# Patient Record
Sex: FEMALE | Race: BLACK OR AFRICAN AMERICAN | NOT HISPANIC OR LATINO | Employment: UNEMPLOYED | URBAN - METROPOLITAN AREA
[De-identification: names, ages, dates, MRNs, and addresses within clinical notes are randomized per-mention and may not be internally consistent; named-entity substitution may affect disease eponyms.]

---

## 2017-12-01 ENCOUNTER — HOSPITAL ENCOUNTER (EMERGENCY)
Facility: OTHER | Age: 27
Discharge: HOME | End: 2017-12-01
Attending: INTERNAL MEDICINE
Payer: MEDICAID

## 2017-12-01 VITALS
TEMPERATURE: 101 F | RESPIRATION RATE: 18 BRPM | SYSTOLIC BLOOD PRESSURE: 132 MMHG | OXYGEN SATURATION: 99 % | WEIGHT: 215 LBS | DIASTOLIC BLOOD PRESSURE: 84 MMHG | HEART RATE: 81 BPM | BODY MASS INDEX: 36.9 KG/M2

## 2017-12-01 DIAGNOSIS — J02.9 PHARYNGITIS, UNSPECIFIED ETIOLOGY: Primary | ICD-10-CM

## 2017-12-01 LAB
B-HCG UR QL: NEGATIVE
CTP QC/QA: YES
CTP QC/QA: YES
S PYO RRNA THROAT QL PROBE: NEGATIVE

## 2017-12-01 PROCEDURE — 63600175 PHARM REV CODE 636 W HCPCS: Performed by: INTERNAL MEDICINE

## 2017-12-01 PROCEDURE — 81025 URINE PREGNANCY TEST: CPT | Performed by: INTERNAL MEDICINE

## 2017-12-01 PROCEDURE — 96372 THER/PROPH/DIAG INJ SC/IM: CPT

## 2017-12-01 PROCEDURE — 87880 STREP A ASSAY W/OPTIC: CPT

## 2017-12-01 PROCEDURE — 25000003 PHARM REV CODE 250: Performed by: INTERNAL MEDICINE

## 2017-12-01 PROCEDURE — 87070 CULTURE OTHR SPECIMN AEROBIC: CPT

## 2017-12-01 PROCEDURE — 99283 EMERGENCY DEPT VISIT LOW MDM: CPT | Mod: 25

## 2017-12-01 RX ORDER — IBUPROFEN 800 MG/1
800 TABLET ORAL EVERY 8 HOURS PRN
Qty: 30 TABLET | Refills: 0 | Status: SHIPPED | OUTPATIENT
Start: 2017-12-01 | End: 2018-04-17

## 2017-12-01 RX ORDER — DEXAMETHASONE SODIUM PHOSPHATE 4 MG/ML
4 INJECTION, SOLUTION INTRA-ARTICULAR; INTRALESIONAL; INTRAMUSCULAR; INTRAVENOUS; SOFT TISSUE
Status: COMPLETED | OUTPATIENT
Start: 2017-12-01 | End: 2017-12-01

## 2017-12-01 RX ORDER — SULFAMETHOXAZOLE AND TRIMETHOPRIM 800; 160 MG/1; MG/1
1 TABLET ORAL
Status: COMPLETED | OUTPATIENT
Start: 2017-12-01 | End: 2017-12-01

## 2017-12-01 RX ORDER — KETOROLAC TROMETHAMINE 30 MG/ML
60 INJECTION, SOLUTION INTRAMUSCULAR; INTRAVENOUS
Status: COMPLETED | OUTPATIENT
Start: 2017-12-01 | End: 2017-12-01

## 2017-12-01 RX ORDER — LIDOCAINE HYDROCHLORIDE 20 MG/ML
SOLUTION OROPHARYNGEAL
COMMUNITY
End: 2018-04-17

## 2017-12-01 RX ORDER — SULFAMETHOXAZOLE AND TRIMETHOPRIM 800; 160 MG/1; MG/1
1 TABLET ORAL 2 TIMES DAILY
Qty: 20 TABLET | Refills: 0 | Status: SHIPPED | OUTPATIENT
Start: 2017-12-01 | End: 2017-12-11

## 2017-12-01 RX ADMIN — SULFAMETHOXAZOLE AND TRIMETHOPRIM 1 TABLET: 800; 160 TABLET ORAL at 12:12

## 2017-12-01 RX ADMIN — DEXAMETHASONE SODIUM PHOSPHATE 4 MG: 4 INJECTION, SOLUTION INTRAMUSCULAR; INTRAVENOUS at 12:12

## 2017-12-01 RX ADMIN — KETOROLAC TROMETHAMINE 60 MG: 60 INJECTION, SOLUTION INTRAMUSCULAR at 10:12

## 2017-12-01 NOTE — ED TRIAGE NOTES
Pt reports having sore throat x 1 week and pain has gotten worse. Pt reports going to another facility and receiving treatment for throat pain but medication has not worked. Pt reports coughing up mucus and having pain when speaking. Pt denies having any other associated symptoms at this time.

## 2017-12-01 NOTE — ED PROVIDER NOTES
Encounter Date: 12/1/2017       History     Chief Complaint   Patient presents with    Sore Throat     patient reports having sore throat X1 week, patient was treated at  for strep throat, patient reports symptoms worsening     27-year-old female presented to the emergency department complaining of sore throat ×1 week.  She was seen at [facility] 2 days ago and given a Bicillin injection.  She states her throat pain has not improved and she still has low-grade fever.      The history is provided by the patient. No  was used.   Sore Throat   This is a new problem. The current episode started more than 2 days ago. The problem occurs daily. The problem has not changed since onset.Pertinent negatives include no chest pain, no headaches and no shortness of breath. The symptoms are aggravated by swallowing. Nothing relieves the symptoms.     Review of patient's allergies indicates:  No Known Allergies  History reviewed. No pertinent past medical history.  History reviewed. No pertinent surgical history.  Family History   Problem Relation Age of Onset    Diabetes Mother     Thyroid disease Father     Ovarian cancer Maternal Aunt      Social History   Substance Use Topics    Smoking status: Current Some Day Smoker     Years: 2.00     Types: Cigarettes    Smokeless tobacco: Not on file      Comment: 2 cigarettes    Alcohol use Yes      Comment: social     Review of Systems   Constitutional: Positive for fever.   HENT: Positive for sore throat.    Respiratory: Negative for shortness of breath.    Cardiovascular: Negative for chest pain.   Neurological: Negative for headaches.   All other systems reviewed and are negative.      Physical Exam     Initial Vitals [12/01/17 0950]   BP Pulse Resp Temp SpO2   136/85 (!) 117 16 (!) 101 °F (38.3 °C) 96 %      MAP       102         Physical Exam    Nursing note and vitals reviewed.  Constitutional: She appears well-developed and  well-nourished.   HENT:   Head: Normocephalic and atraumatic.   Posterior oropharyngeal tonsillar erythema, edema and exudate; airway is patent   Eyes: Conjunctivae and EOM are normal.   Neck: Normal range of motion.   Cardiovascular: Normal rate and regular rhythm.   Pulmonary/Chest: Breath sounds normal. No respiratory distress.   Abdominal: Soft. Bowel sounds are normal.   Musculoskeletal: Normal range of motion.   Neurological: She is alert.   Skin: Skin is warm and dry.   Psychiatric: She has a normal mood and affect.         ED Course   Procedures  Labs Reviewed   CULTURE, RESPIRATORY  - THROAT   POCT RAPID STREP A   POCT URINE PREGNANCY             Medical Decision Making:   Initial Assessment:   27-year-old female presented to the emergency department complaining of sore throat ×1 week.  She was seen at [facility] 2 days ago and given a Bicillin injection.  She states her throat pain has not improved and she still has low-grade fever.    Differential Diagnosis:   Strep throat  Acute nasopharyngitis    ED Management:  Rapid strep was negative.  Throat culture was obtained.  Patient was already been treated with Bicillin, so I will cover her for resistant staph with Bactrim.  She was also given a dose of Decadron for pharyngeal tonsillar edema and advised to follow-up with her primary care physician in 3 days for reevaluation and review of throat culture.                   ED Course      Clinical Impression:   The encounter diagnosis was Pharyngitis, unspecified etiology.    Disposition:   Disposition: Discharged  Condition: Stable                        Diogenes Alvarez MD  12/01/17 4746

## 2017-12-03 LAB — BACTERIA THROAT CULT: NORMAL

## 2017-12-18 ENCOUNTER — HOSPITAL ENCOUNTER (EMERGENCY)
Facility: OTHER | Age: 27
End: 2017-12-18
Attending: EMERGENCY MEDICINE
Payer: MEDICAID

## 2017-12-18 VITALS
RESPIRATION RATE: 18 BRPM | TEMPERATURE: 98 F | HEIGHT: 63 IN | OXYGEN SATURATION: 98 % | BODY MASS INDEX: 38.09 KG/M2 | SYSTOLIC BLOOD PRESSURE: 130 MMHG | WEIGHT: 215 LBS | HEART RATE: 95 BPM | DIASTOLIC BLOOD PRESSURE: 93 MMHG

## 2017-12-18 DIAGNOSIS — N89.8 VAGINAL DISCHARGE: ICD-10-CM

## 2017-12-18 DIAGNOSIS — J36 PERITONSILLAR ABSCESS: Primary | ICD-10-CM

## 2017-12-18 LAB
ALBUMIN SERPL-MCNC: 3.2 G/DL (ref 3.3–5.5)
ALP SERPL-CCNC: 58 U/L (ref 42–141)
B-HCG UR QL: NEGATIVE
BILIRUB SERPL-MCNC: 0.4 MG/DL (ref 0.2–1.6)
BUN SERPL-MCNC: 11 MG/DL (ref 7–22)
CALCIUM SERPL-MCNC: 8.9 MG/DL (ref 8–10.3)
CHLORIDE SERPL-SCNC: 104 MMOL/L (ref 98–108)
CREAT SERPL-MCNC: 0.9 MG/DL (ref 0.6–1.2)
CTP QC/QA: YES
CTP QC/QA: YES
GLUCOSE SERPL-MCNC: 94 MG/DL (ref 73–118)
POC ALT (SGPT): 15 U/L (ref 10–47)
POC AST (SGOT): 21 U/L (ref 11–38)
POC TCO2: 25 MMOL/L (ref 18–33)
POTASSIUM BLD-SCNC: 3.4 MMOL/L (ref 3.6–5.1)
PROTEIN, POC: 7.2 G/DL (ref 6.4–8.1)
S PYO RRNA THROAT QL PROBE: NEGATIVE
SODIUM BLD-SCNC: 140 MMOL/L (ref 128–145)

## 2017-12-18 PROCEDURE — 87591 N.GONORRHOEAE DNA AMP PROB: CPT

## 2017-12-18 PROCEDURE — 96368 THER/DIAG CONCURRENT INF: CPT

## 2017-12-18 PROCEDURE — 85025 COMPLETE CBC W/AUTO DIFF WBC: CPT

## 2017-12-18 PROCEDURE — 99285 EMERGENCY DEPT VISIT HI MDM: CPT | Mod: 25

## 2017-12-18 PROCEDURE — 80053 COMPREHEN METABOLIC PANEL: CPT

## 2017-12-18 PROCEDURE — 81025 URINE PREGNANCY TEST: CPT | Performed by: EMERGENCY MEDICINE

## 2017-12-18 PROCEDURE — S0077 INJECTION, CLINDAMYCIN PHOSP: HCPCS | Performed by: EMERGENCY MEDICINE

## 2017-12-18 PROCEDURE — 87880 STREP A ASSAY W/OPTIC: CPT

## 2017-12-18 PROCEDURE — 96366 THER/PROPH/DIAG IV INF ADDON: CPT

## 2017-12-18 PROCEDURE — 96375 TX/PRO/DX INJ NEW DRUG ADDON: CPT

## 2017-12-18 PROCEDURE — 99285 EMERGENCY DEPT VISIT HI MDM: CPT | Mod: 25,27

## 2017-12-18 PROCEDURE — 96365 THER/PROPH/DIAG IV INF INIT: CPT

## 2017-12-18 PROCEDURE — 25500020 PHARM REV CODE 255

## 2017-12-18 PROCEDURE — 87660 TRICHOMONAS VAGIN DIR PROBE: CPT

## 2017-12-18 PROCEDURE — 25000003 PHARM REV CODE 250: Performed by: EMERGENCY MEDICINE

## 2017-12-18 PROCEDURE — 87480 CANDIDA DNA DIR PROBE: CPT

## 2017-12-18 PROCEDURE — 99285 EMERGENCY DEPT VISIT HI MDM: CPT | Mod: ,,, | Performed by: EMERGENCY MEDICINE

## 2017-12-18 RX ORDER — CLINDAMYCIN PHOSPHATE 600 MG/50ML
600 INJECTION, SOLUTION INTRAVENOUS
Status: COMPLETED | OUTPATIENT
Start: 2017-12-18 | End: 2017-12-18

## 2017-12-18 RX ADMIN — IOHEXOL: 350 INJECTION, SOLUTION INTRAVENOUS at 04:12

## 2017-12-18 RX ADMIN — CLINDAMYCIN IN 5 PERCENT DEXTROSE 600 MG: 12 INJECTION, SOLUTION INTRAVENOUS at 06:12

## 2017-12-18 NOTE — ED PROVIDER NOTES
Encounter Date: 12/18/2017       History     Chief Complaint   Patient presents with    Sore Throat     A 27-year-old female who presents to the ED with complaints of sore throat for 3 weeks.  Patient was seen at [facility] at the end of November.  Patient was given Bicillin for treatment of strep.  Patient was seen here on December 1 2017 due to complaints of sore throat.  Patient was treated for Bactrim DS.  Throat culture was performed. No isolates.  Patient continues to complain of sore throat.  Patient reports a yellowish vaginal discharge ×1 week.  Denies abdominal pain.      The history is provided by the patient.   Sore Throat   This is a new problem. The current episode started more than 1 week ago. The problem has been gradually worsening. Pertinent negatives include no chest pain and no shortness of breath. The symptoms are aggravated by swallowing. Nothing relieves the symptoms.     Review of patient's allergies indicates:  No Known Allergies  History reviewed. No pertinent past medical history.  History reviewed. No pertinent surgical history.  Family History   Problem Relation Age of Onset    Diabetes Mother     Thyroid disease Father     Ovarian cancer Maternal Aunt      Social History   Substance Use Topics    Smoking status: Former Smoker     Years: 2.00    Smokeless tobacco: Never Used      Comment: 2 cigarettes    Alcohol use Yes      Comment: social     Review of Systems   Constitutional: Negative for fever.   HENT: Positive for sore throat.    Respiratory: Negative for shortness of breath.    Cardiovascular: Negative for chest pain.   Gastrointestinal: Negative for nausea.   Genitourinary: Positive for vaginal discharge. Negative for dysuria.   Musculoskeletal: Negative for back pain.   Skin: Negative for rash.   Neurological: Negative for weakness.   Hematological: Does not bruise/bleed easily.   All other systems reviewed and are negative.      Physical Exam     Initial Vitals [12/18/17  1347]   BP Pulse Resp Temp SpO2   (!) 143/89 102 14 98.1 °F (36.7 °C) 97 %      MAP       107         Physical Exam    Nursing note and vitals reviewed.  Constitutional: Vital signs are normal. She appears well-developed. She is cooperative.  Non-toxic appearance.   HENT:   Head: Normocephalic and atraumatic.   Right Ear: Tympanic membrane, external ear and ear canal normal.   Left Ear: Tympanic membrane, external ear and ear canal normal.   Nose: Nose normal.   Mouth/Throat: Mucous membranes are normal. Oropharyngeal exudate, posterior oropharyngeal edema and posterior oropharyngeal erythema present.   Right tonsil larger and canales than left. Possible early peritonsillar abscess.   Eyes: Conjunctivae and lids are normal.   Neck: Normal range of motion. Neck supple.   Cardiovascular: Normal rate and regular rhythm.   Pulmonary/Chest: Effort normal and breath sounds normal.   Abdominal: Normal appearance and bowel sounds are normal. There is no tenderness.   Genitourinary: Uterus normal. There is no rash, tenderness, lesion or injury on the right labia. There is no rash, tenderness, lesion or injury on the left labia. Cervix exhibits discharge. Cervix exhibits no motion tenderness and no friability. Right adnexum displays no mass, no tenderness and no fullness. Left adnexum displays no mass, no tenderness and no fullness. No erythema, tenderness or bleeding in the vagina. No foreign body in the vagina. Vaginal discharge found.   Genitourinary Comments: Thick white vaginal discharge noted. Chaperone present.    Musculoskeletal: Normal range of motion.   Lymphadenopathy:     She has cervical adenopathy.        Right cervical: Superficial cervical adenopathy present.        Left cervical: Superficial cervical adenopathy present.   Neurological: She is alert and oriented to person, place, and time.   Skin: Skin is warm, dry and intact.   Psychiatric: She has a normal mood and affect. Judgment and thought content normal.  Cognition and memory are normal.         ED Course   Procedures  Labs Reviewed   POCT CMP - Abnormal; Notable for the following:        Result Value    Albumin, POC 3.2 (*)     POC Potassium 3.4 (*)     All other components within normal limits   C. TRACHOMATIS/N. GONORRHOEAE BY AMP DNA   VAGINOSIS SCREEN BY DNA PROBE   POCT RAPID STREP A   POCT URINE PREGNANCY   POCT CBC   POCT CMP             Medical Decision Making:   Initial Assessment:   A 27-year-old female presents to the ED with complaints of sore throat for 3 weeks.  Patient was treated for strep with Bicillin IM at the end of November.  Patient then treated with Bactrim DS on December 1, 2017. Pt continue to have a sore throat.   Differential Diagnosis:   Pharyngitis, viral pharyngitis, peritonsillar abscess.  Clinical Tests:   Lab Tests: Ordered and Reviewed  Radiological Study: Ordered and Reviewed  ED Management:  Physical exam.  CMP, CBC.  CT of soft tissue neck with contrast.  Patient transferred to [facility] for ENT evaluation.  Will contact with wet prep and GC chlamydia results.               Attending Attestation:     Physician Attestation Statement for NP/PA:   I have conducted a face to face encounter with this patient in addition to the NP/PA, due to NP/PA Request    Other NP/PA Attestation Additions:    History of Present Illness: Ms Nettles reports persistent sore throat after completing 2 courses of abx over the past 3 weeks. Still able to swallow and perform normal adls but it hurts.    Physical Exam: Well appearing non toxic pt with normal voice, well kempt. NAD  No resp distress, moving air well. No tachypnea.   Positive diffuse posterior oropharyngeal erythema with bilateral full tonsils. Subtle R peritonsillar fullness suspicious for early peritonsillar abscess. Normal midline non deviated uvula.    Medical Decision Making: Labs, CT performed, c/w early abscess, peritonsillar. [facility] called for ENT consult, called back and states ENT  wants to auto accept. Attempted to discuss with ED attending dr rodrigues but reportedly quite busy, unable to talk. Pt is stable and non toxic at this time.                  ED Course      Clinical Impression:   The encounter diagnosis was Peritonsillar abscess.                           REINALDO Tsang  12/19/17 1748       Ольга Rankin MD  12/20/17 1149       Ольга Rankin MD  12/20/17 1142

## 2017-12-18 NOTE — ED TRIAGE NOTES
Onset of sore throat and difficulty swallowing Friday. Pt states she has taken ibuprofen without relief. Airway patent. In no resp distress. Tonsils edematous no exudate noted. Rapid strep swab obtained

## 2017-12-18 NOTE — ED TRIAGE NOTES
Pt presents to ED with c/o difficulty swallowing since Friday. Pt reports taking ibuprofen for pain but has not helped. Pt denies any other associated symptoms.

## 2017-12-19 ENCOUNTER — HOSPITAL ENCOUNTER (OUTPATIENT)
Facility: HOSPITAL | Age: 27
Discharge: HOME | End: 2017-12-20
Attending: EMERGENCY MEDICINE | Admitting: OTOLARYNGOLOGY
Payer: MEDICAID

## 2017-12-19 DIAGNOSIS — J36 PERITONSILLAR ABSCESS: ICD-10-CM

## 2017-12-19 LAB
C TRACH DNA SPEC QL NAA+PROBE: NOT DETECTED
CANDIDA RRNA VAG QL PROBE: NEGATIVE
G VAGINALIS RRNA GENITAL QL PROBE: POSITIVE
N GONORRHOEA DNA SPEC QL NAA+PROBE: NOT DETECTED
T VAGINALIS RRNA GENITAL QL PROBE: NEGATIVE

## 2017-12-19 PROCEDURE — 63600175 PHARM REV CODE 636 W HCPCS: Performed by: OTOLARYNGOLOGY

## 2017-12-19 PROCEDURE — S0077 INJECTION, CLINDAMYCIN PHOSP: HCPCS | Performed by: OTOLARYNGOLOGY

## 2017-12-19 PROCEDURE — G0378 HOSPITAL OBSERVATION PER HR: HCPCS

## 2017-12-19 PROCEDURE — 25000003 PHARM REV CODE 250: Performed by: OTOLARYNGOLOGY

## 2017-12-19 PROCEDURE — 25000003 PHARM REV CODE 250: Performed by: STUDENT IN AN ORGANIZED HEALTH CARE EDUCATION/TRAINING PROGRAM

## 2017-12-19 PROCEDURE — 63600175 PHARM REV CODE 636 W HCPCS: Performed by: STUDENT IN AN ORGANIZED HEALTH CARE EDUCATION/TRAINING PROGRAM

## 2017-12-19 PROCEDURE — 94761 N-INVAS EAR/PLS OXIMETRY MLT: CPT

## 2017-12-19 RX ORDER — DEXAMETHASONE SODIUM PHOSPHATE 4 MG/ML
8 INJECTION, SOLUTION INTRA-ARTICULAR; INTRALESIONAL; INTRAMUSCULAR; INTRAVENOUS; SOFT TISSUE EVERY 6 HOURS
Status: DISCONTINUED | OUTPATIENT
Start: 2017-12-19 | End: 2017-12-19

## 2017-12-19 RX ORDER — CLINDAMYCIN PHOSPHATE 900 MG/50ML
900 INJECTION, SOLUTION INTRAVENOUS
Status: DISCONTINUED | OUTPATIENT
Start: 2017-12-19 | End: 2017-12-20 | Stop reason: HOSPADM

## 2017-12-19 RX ORDER — PROMETHAZINE HYDROCHLORIDE 12.5 MG/1
12.5 TABLET ORAL EVERY 6 HOURS PRN
Status: DISCONTINUED | OUTPATIENT
Start: 2017-12-19 | End: 2017-12-20 | Stop reason: HOSPADM

## 2017-12-19 RX ORDER — ONDANSETRON 2 MG/ML
8 INJECTION INTRAMUSCULAR; INTRAVENOUS EVERY 8 HOURS PRN
Status: DISCONTINUED | OUTPATIENT
Start: 2017-12-19 | End: 2017-12-20 | Stop reason: HOSPADM

## 2017-12-19 RX ORDER — MORPHINE SULFATE 2 MG/ML
6 INJECTION, SOLUTION INTRAMUSCULAR; INTRAVENOUS
Status: COMPLETED | OUTPATIENT
Start: 2017-12-19 | End: 2017-12-19

## 2017-12-19 RX ORDER — DEXAMETHASONE SODIUM PHOSPHATE 4 MG/ML
8 INJECTION, SOLUTION INTRA-ARTICULAR; INTRALESIONAL; INTRAMUSCULAR; INTRAVENOUS; SOFT TISSUE EVERY 8 HOURS
Status: DISCONTINUED | OUTPATIENT
Start: 2017-12-19 | End: 2017-12-20 | Stop reason: HOSPADM

## 2017-12-19 RX ORDER — HYDROCODONE BITARTRATE AND ACETAMINOPHEN 5; 325 MG/1; MG/1
1 TABLET ORAL EVERY 4 HOURS PRN
Status: DISCONTINUED | OUTPATIENT
Start: 2017-12-19 | End: 2017-12-20 | Stop reason: HOSPADM

## 2017-12-19 RX ORDER — MORPHINE SULFATE 2 MG/ML
2 INJECTION, SOLUTION INTRAMUSCULAR; INTRAVENOUS
Status: DISCONTINUED | OUTPATIENT
Start: 2017-12-19 | End: 2017-12-20 | Stop reason: HOSPADM

## 2017-12-19 RX ORDER — DEXTROSE MONOHYDRATE, SODIUM CHLORIDE, AND POTASSIUM CHLORIDE 50; 1.49; 4.5 G/1000ML; G/1000ML; G/1000ML
INJECTION, SOLUTION INTRAVENOUS CONTINUOUS
Status: DISCONTINUED | OUTPATIENT
Start: 2017-12-19 | End: 2017-12-20 | Stop reason: HOSPADM

## 2017-12-19 RX ADMIN — CLINDAMYCIN IN 5 PERCENT DEXTROSE 900 MG: 18 INJECTION, SOLUTION INTRAVENOUS at 06:12

## 2017-12-19 RX ADMIN — MORPHINE SULFATE 6 MG: 2 INJECTION, SOLUTION INTRAMUSCULAR; INTRAVENOUS at 01:12

## 2017-12-19 RX ADMIN — MORPHINE SULFATE 2 MG: 2 INJECTION, SOLUTION INTRAMUSCULAR; INTRAVENOUS at 09:12

## 2017-12-19 RX ADMIN — CLINDAMYCIN IN 5 PERCENT DEXTROSE 900 MG: 18 INJECTION, SOLUTION INTRAVENOUS at 11:12

## 2017-12-19 RX ADMIN — HYDROCODONE BITARTRATE AND ACETAMINOPHEN 1 TABLET: 5; 325 TABLET ORAL at 11:12

## 2017-12-19 RX ADMIN — DEXAMETHASONE SODIUM PHOSPHATE 8 MG: 4 INJECTION, SOLUTION INTRAMUSCULAR; INTRAVENOUS at 08:12

## 2017-12-19 RX ADMIN — ONDANSETRON 8 MG: 2 INJECTION INTRAMUSCULAR; INTRAVENOUS at 08:12

## 2017-12-19 RX ADMIN — DEXAMETHASONE SODIUM PHOSPHATE 8 MG: 4 INJECTION, SOLUTION INTRAMUSCULAR; INTRAVENOUS at 01:12

## 2017-12-19 RX ADMIN — DEXTROSE MONOHYDRATE, SODIUM CHLORIDE, AND POTASSIUM CHLORIDE: 50; 4.5; 1.49 INJECTION, SOLUTION INTRAVENOUS at 02:12

## 2017-12-19 RX ADMIN — MORPHINE SULFATE 2 MG: 2 INJECTION, SOLUTION INTRAMUSCULAR; INTRAVENOUS at 07:12

## 2017-12-19 RX ADMIN — TOPICAL ANESTHETIC: 200 SPRAY DENTAL; PERIODONTAL at 01:12

## 2017-12-19 RX ADMIN — ONDANSETRON 8 MG: 2 INJECTION INTRAMUSCULAR; INTRAVENOUS at 01:12

## 2017-12-19 RX ADMIN — DEXAMETHASONE SODIUM PHOSPHATE 8 MG: 4 INJECTION, SOLUTION INTRAMUSCULAR; INTRAVENOUS at 06:12

## 2017-12-19 RX ADMIN — HYDROCODONE BITARTRATE AND ACETAMINOPHEN 1 TABLET: 5; 325 TABLET ORAL at 06:12

## 2017-12-19 RX ADMIN — PROMETHAZINE HYDROCHLORIDE 12.5 MG: 12.5 TABLET ORAL at 10:12

## 2017-12-19 RX ADMIN — MORPHINE SULFATE 2 MG: 2 INJECTION, SOLUTION INTRAMUSCULAR; INTRAVENOUS at 06:12

## 2017-12-19 RX ADMIN — CLINDAMYCIN IN 5 PERCENT DEXTROSE 900 MG: 18 INJECTION, SOLUTION INTRAVENOUS at 02:12

## 2017-12-19 RX ADMIN — DEXTROSE MONOHYDRATE, SODIUM CHLORIDE, AND POTASSIUM CHLORIDE: 50; 4.5; 1.49 INJECTION, SOLUTION INTRAVENOUS at 11:12

## 2017-12-19 NOTE — ED TRIAGE NOTES
Pt transfer from [facility] for peritonsillar abscess since Saturday night. Denies fever,chills, n/v. + loss of appetite      LOC: The patient is awake, alert, aware of environment with an appropriate affect. Oriented x4, speaking appropriately  APPEARANCE: Pt resting comfortably, in no acute distress, pt is clean and well groomed, clothing properly fastened  SKIN:The skin is warm and dry, color consistent with ethnicity, patient has normal skin turgor and moist mucus membranes  RESPIRATORY:Airway is open and patent, respirations are spontaneous, patient has a normal effort and rate, no accessory muscle use noted.  CARDIAC: Normal rate and rhythm, no peripheral edema noted, capillary refill < 3 seconds, bilateral radial pulses 2+.  NEUROLOGIC: PERRLA, facial expression is symmetrical, patient moving all extremities spontaneously, normal sensation in all extremities when touched with a finger.  Follows all commands appropriately  MUSCULOSKELETAL: Patient moving all extremities spontaneously, no obvious swelling or deformities noted.  THROAT: + tonsilar abscess

## 2017-12-19 NOTE — HPI
Gloria Nettles is a 28 yo female with no significant PMH presenting with 3 weeks of sore throat. She went to an urgent care three weeks ago where she was initially given Bicillin. She returned 12/1 with continued sore throat and fever, where she was given Bactrim DS. Today she returned, now with odynophagia and muffled voice. CT of the neckrevealed a possible right peritonsillar abscess. She was transferred to [facility] for ENT evaluation.  She denies any relief from the single dose of IV clindamycin she received. She has not eaten in the last day, difficulty drinking liquids today. No fevers. Denies SOB.

## 2017-12-19 NOTE — ED NOTES
Spoke with Flori at Transfer center. Updated set of VS obtained and reported, Accepting physician ( Dr. Contreras) was received and contact information.

## 2017-12-19 NOTE — ED NOTES
Patient sitting up on the stretcher, Respirations even and unlabored, no distress noted. She denies any needs at this time. Will continue to monitor until transfer.

## 2017-12-19 NOTE — ASSESSMENT & PLAN NOTE
26 yo woman presenting with right peritonsillar abscess.    -I&D performed at bedside. See below  -admit to ENT for IV abx  -decadron, pain control  -ok for regular diet  -will evaluate for clinical improvement.  -d/w Dr. Benito      Procedure: After informed consent was obtained, the patient's tonsillar pillars were numbed with hurricaine spray. Lidocaine with epinephrine 1:100,000 was injected along the right anterior tonsillar pillar. An 18 gauge needle was inserted and aspirated without return of pus. A linear incision was then made after anesthesia was achieved. Blunt dissection using hemostat opened the incision with return of 0cc pus. The patient tolerated the procedure well. There were no apparent complications.

## 2017-12-19 NOTE — SUBJECTIVE & OBJECTIVE
Medications:  Continuous Infusions:  Scheduled Meds:   dexamethasone  8 mg Intravenous Q6H     PRN Meds:     Current Facility-Administered Medications on File Prior to Encounter   Medication    [COMPLETED] clindamycin 600 MG/50 ML D5W 600 mg/50 mL IVPB 600 mg    [COMPLETED] omnipaque 350 iohexol 350 mg iodine/mL     Current Outpatient Prescriptions on File Prior to Encounter   Medication Sig    ibuprofen (ADVIL,MOTRIN) 800 MG tablet Take 1 tablet (800 mg total) by mouth every 8 (eight) hours as needed for Pain.    lidocaine HCl 2% (XYLOCAINE) 2 % Soln by Mucous Membrane route every 3 (three) hours.    potassium chloride SA (K-DUR,KLOR-CON) 20 MEQ tablet Take 1 tablet (20 mEq total) by mouth once daily.       Review of patient's allergies indicates:  No Known Allergies    History reviewed. No pertinent past medical history.  History reviewed. No pertinent surgical history.  Family History     Problem Relation (Age of Onset)    Diabetes Mother    Ovarian cancer Maternal Aunt    Thyroid disease Father        Social History Main Topics    Smoking status: Former Smoker     Years: 2.00    Smokeless tobacco: Never Used      Comment: 2 cigarettes    Alcohol use Yes      Comment: social    Drug use: No    Sexual activity: Yes     Partners: Male     Birth control/ protection: Inserts     Review of Systems   Constitutional: Positive for appetite change. Negative for fever.   HENT: Positive for congestion, sore throat, trouble swallowing and voice change. Negative for drooling, ear pain, facial swelling and hearing loss.    Eyes: Negative for photophobia and pain.   Respiratory: Negative for shortness of breath, wheezing and stridor.    Cardiovascular: Negative for chest pain.   Gastrointestinal: Negative for abdominal pain.   Musculoskeletal: Negative for back pain and gait problem.   Allergic/Immunologic: Negative for immunocompromised state.   Neurological: Negative for facial asymmetry, light-headedness, numbness  and headaches.   Hematological: Does not bruise/bleed easily.   Psychiatric/Behavioral: Negative for agitation, behavioral problems and confusion.     Objective:     Vital Signs (Most Recent):  Temp: 98.2 °F (36.8 °C) (12/18/17 2228)  Pulse: 88 (12/18/17 2228)  Resp: 18 (12/18/17 2228)  BP: (!) 148/92 (12/18/17 2228)  SpO2: 98 % (12/18/17 2228) Vital Signs (24h Range):  Temp:  [97.6 °F (36.4 °C)-98.2 °F (36.8 °C)] 98.2 °F (36.8 °C)  Pulse:  [] 88  Resp:  [14-18] 18  SpO2:  [96 %-98 %] 98 %  BP: (130-148)/(89-95) 148/92        There is no height or weight on file to calculate BMI.         Physical Exam   Constitutional: Uncomfortable-appearing / communicating with muffled voice.  NAD.  Eyes: EOM I Bilaterally  Head/Face: Normocephalic.  Negative paranasal sinus pressure/tenderness.  Salivary glands WNL.  House Brackmann I Bilaterally.  Nose: No gross nasal septal deviation. Inferior Turbinates 2+ bilaterally. No septal perforation. No masses/lesions. External nasal skin without masses/lesions.  Oral Cavity: Gingiva/lips WNL.  FOM Soft, no masses palpated. Oral Tongue mobile. Hard Palate WNL.   Oropharynx: Bilateral enlarged tonsils, R>L. No exudates present. Deviation of uvula to left. Moderate trismus  Neck/Lymphatic: No LAD I-VI bilaterally.  No thyromegaly.  No masses noted on exam.  Neuro/Psychiatric: AOx3.  Normal mood and affect.   Cardiovascular: Normal carotid pulses bilaterally, no increasing jugular venous distention noted at cervical region bilaterally.    Respiratory: Normal respiratory effort, no stridor, no retractions noted.        Significant Labs:  BMP: No results for input(s): GLU, CL, CO2, BUN, CREATININE, CALCIUM, MG in the last 168 hours.    Invalid input(s): SODIUM, POTASSIUM  CBC: No results for input(s): WBC, RBC, HGB, HCT, PLT, MCV, MCH, MCHC in the last 168 hours.    Significant Diagnostics:  CT Neck: Enlargement of both palatine tonsils, right more than left.  A small hypoattenuating  area within the right peritonsillar region is suspicious for a phlegmon or early small forming abscess.

## 2017-12-19 NOTE — ED PROVIDER NOTES
Encounter Date: 12/18/2017       History     Chief Complaint   Patient presents with    transfer     ENT transfer from [facility] ED for peritonsillar abscess. Airway patent. Complains of sore throat.     Ms Nettles is a 28 yo woman with no pertinent past medical history who has been transferred here from [facility] urgent care for management of peritonsillar abscess noted on CT scan. Pt has been feeing needling pain on the right side of her throat of 9/10 intensity for the past three weeks with gradual onset. Pt was initially seen in that urgent care three weeks prior and diagnosed with pharyngitis and treated with a bicillin shot and a course of bactrim, which she completed. Pt's symptoms improved for a time, however returned and worsened to the point where she is having difficulty swallowing. Pt describes the sensation as something being caught in her throat. Pt has never experienced anything like this before and cannot attribute any cause to her current symptoms. Nothing has been able to alleviate her pain thus far.     Pain does not radiate anywhere. She denies current fever. Pt does endorse some mild shortness of breath and headache.           Review of patient's allergies indicates:  No Known Allergies  History reviewed. No pertinent past medical history.  History reviewed. No pertinent surgical history.  Family History   Problem Relation Age of Onset    Diabetes Mother     Thyroid disease Father     Ovarian cancer Maternal Aunt      Social History   Substance Use Topics    Smoking status: Former Smoker     Years: 2.00    Smokeless tobacco: Never Used      Comment: 2 cigarettes    Alcohol use Yes      Comment: social     Review of Systems   Constitutional: Negative for fever and unexpected weight change.   HENT: Positive for drooling, sore throat, trouble swallowing and voice change. Negative for ear pain, sinus pressure and sneezing.    Eyes: Negative for pain and visual disturbance.   Respiratory:  Negative for cough, chest tightness, shortness of breath and wheezing.    Cardiovascular: Negative for chest pain, palpitations and leg swelling.   Gastrointestinal: Negative for abdominal pain, constipation, diarrhea, nausea and vomiting.   Endocrine: Negative for polydipsia and polyuria.   Genitourinary: Negative for decreased urine volume, difficulty urinating, dysuria and urgency.   Musculoskeletal: Negative for arthralgias and myalgias.   Skin: Negative for color change and rash.   Allergic/Immunologic: Negative for environmental allergies and food allergies.   Neurological: Negative for dizziness, syncope, weakness, light-headedness and headaches.   Psychiatric/Behavioral: Negative for confusion and dysphoric mood. The patient is not nervous/anxious.        Physical Exam     Initial Vitals [12/18/17 2228]   BP Pulse Resp Temp SpO2   (!) 148/92 88 18 98.2 °F (36.8 °C) 98 %      MAP       110.67         Physical Exam    Nursing note and vitals reviewed.  Constitutional: She appears well-developed and well-nourished.   HENT:   Head: Normocephalic and atraumatic.   Nose: Nose normal.   Mouth/Throat: No trismus in the jaw. No dental abscesses or uvula swelling. Oropharyngeal exudate, posterior oropharyngeal edema, posterior oropharyngeal erythema and tonsillar abscesses present.   Eyes: EOM are normal. Pupils are equal, round, and reactive to light.   Neck: No tracheal deviation present.   Cardiovascular: Normal rate, regular rhythm, normal heart sounds and intact distal pulses.   No murmur heard.  Pulmonary/Chest: Breath sounds normal. No stridor. No respiratory distress. She exhibits no tenderness.   Abdominal: Soft. Bowel sounds are normal. There is no tenderness.   Musculoskeletal: Normal range of motion. She exhibits no tenderness.   Lymphadenopathy:     She has no cervical adenopathy.   Neurological: She is alert and oriented to person, place, and time.   Skin: Skin is warm and dry.   Psychiatric: She has a  normal mood and affect.         ED Course   Procedures  Labs Reviewed - No data to display       X-Rays:   Independently Interpreted Readings:   Head CT: Abscess vs phlegmon noted in the peritonsillar space.      Medical Decision Making:   History:   Old Medical Records: I decided to obtain old medical records.  Initial Assessment:   Pt is a 28 yo woman without significant PMH who presents to [facility] for evaluation of throat pain of three weeks duration with subsequent CT scan suggesting presence of abscess in the peritonsillar space.   Differential Diagnosis:   Viral pharyngitis  Bacterial pharyngitis  Eagle syndrome   Retropharyngeal abscess  Peritonsillar abscess  Independently Interpreted Test(s):   I have ordered and independently interpreted X-rays - see prior notes.  I have ordered and independently interpreted EKG Reading(s) - see prior notes  Clinical Tests:   Lab Tests: Ordered  Radiological Study: Ordered  Medical Tests: Ordered  ED Management:  Prior to arrival in [facility], pt was administered a single dose of IV clindamycin. ENT was made aware of pt prior to arrival and have agreed to perform bedside needle aspiration of the abscess.        APC / Resident Notes:   1:38 AM  ENT at bedside.     Behram Khan, MD  Internal Medicine, PGY-1           Attending Attestation:   Physician Attestation Statement for Resident:  As the supervising MD   Physician Attestation Statement: I have personally seen and examined this patient.   I agree with the above history. -:   As the supervising MD I agree with the above PE.    As the supervising MD I agree with the above treatment, course, plan, and disposition.  I have reviewed and agree with the residents interpretation of the following: CT scans and lab data.            Attending ED Notes:   2:19 AM  Pt to be admitted observation to ENT          ED Course      Clinical Impression:   The encounter diagnosis was Peritonsillar abscess.    Disposition:   Disposition: Placed  in Observation  Condition: Stable                        Alina Rich MD  12/19/17 2930

## 2017-12-19 NOTE — PLAN OF CARE
Patient is a 27 year old female admitted through the ER today with a Peritonsillar Abscess.  I&D performed in the ER at the bedside.  Patient is being observed overnight and is expected to discharge home with no needs tomorrow.    Patient lives in a two story home with 15 steps to get to her bed and bathroom her sister, who will drive her home and obtain anything she needs after discharge.  Patient given [employer] Packet with understanding verbalized.  Will continue to follow for needs.    PCP  Kaitlyn Cespedes NP  [address] / [place] [address] [phone]      [facility] [facility] [id number] - [place], [place] - [address] AT [address]  [address]  [place] [address]  Phone: [phone] Fax: [phone]      Extended Emergency Contact Information  Primary Emergency Contact: Dixie Nettles   [facility]  Home Phone: [phone]  Mobile Phone: [phone]  Relation: Sister  Preferred language: English       12/19/17 1505   Discharge Assessment   Assessment Type Discharge Planning Assessment   Confirmed/corrected address and phone number on facesheet? Yes   Assessment information obtained from? Patient   Expected Length of Stay (days) 2   Prior to hospitilization cognitive status: Alert/Oriented   Prior to hospitalization functional status: Independent   Current cognitive status: Alert/Oriented   Current Functional Status: Independent   Lives With sibling(s)   Able to Return to Prior Arrangements yes   Is patient able to care for self after discharge? Yes   Who are your caregiver(s) and their phone number(s)? sister   Patient's perception of discharge disposition home or selfcare   Equipment Currently Used at Home none   Do you have any problems affording any of your prescribed medications? No   Is the patient taking medications as prescribed? yes   Does the patient have transportation home? Yes   Transportation Available family or friend will  provide   Discharge Plan A Home with family   Patient/Family In Agreement With Plan yes

## 2017-12-19 NOTE — CONSULTS
[facility]  Otorhinolaryngology-Head & Neck Surgery  Consult Note    Patient Name: Gloria Nettles  MRN: [id number]  Code Status: Full Code  Admission Date: 12/19/2017  Hospital Length of Stay: 0 days  Attending Physician: Alina Rich MD  Primary Care Provider: Kaitlyn Cespedes NP    Patient information was obtained from patient, spouse/SO and ER records.     Consults  Subjective:     Chief Complaint/Reason for Admission: sore throat    History of Present Illness: Gloria Nettles is a 28 yo female with no significant PMH presenting with 3 weeks of sore throat. She went to an urgent care three weeks ago where she was initially given Bicillin. She returned 12/1 with continued sore throat and fever, where she was given Bactrim DS. Today she returned, now with odynophagia and muffled voice. CT of the neckrevealed a possible right peritonsillar abscess. She was transferred to [facility] for ENT evaluation.  She denies any relief from the single dose of IV clindamycin she received. She has not eaten in the last day, difficulty drinking liquids today. No fevers. Denies SOB.    Medications:  Continuous Infusions:  Scheduled Meds:   dexamethasone  8 mg Intravenous Q6H     PRN Meds:     Current Facility-Administered Medications on File Prior to Encounter   Medication    [COMPLETED] clindamycin 600 MG/50 ML D5W 600 mg/50 mL IVPB 600 mg    [COMPLETED] omnipaque 350 iohexol 350 mg iodine/mL     Current Outpatient Prescriptions on File Prior to Encounter   Medication Sig    ibuprofen (ADVIL,MOTRIN) 800 MG tablet Take 1 tablet (800 mg total) by mouth every 8 (eight) hours as needed for Pain.    lidocaine HCl 2% (XYLOCAINE) 2 % Soln by Mucous Membrane route every 3 (three) hours.    potassium chloride SA (K-DUR,KLOR-CON) 20 MEQ tablet Take 1 tablet (20 mEq total) by mouth once daily.       Review of patient's allergies indicates:  No Known Allergies    History reviewed. No pertinent past medical  history.  History reviewed. No pertinent surgical history.  Family History     Problem Relation (Age of Onset)    Diabetes Mother    Ovarian cancer Maternal Aunt    Thyroid disease Father        Social History Main Topics    Smoking status: Former Smoker     Years: 2.00    Smokeless tobacco: Never Used      Comment: 2 cigarettes    Alcohol use Yes      Comment: social    Drug use: No    Sexual activity: Yes     Partners: Male     Birth control/ protection: Inserts     Review of Systems   Constitutional: Positive for appetite change. Negative for fever.   HENT: Positive for congestion, sore throat, trouble swallowing and voice change. Negative for drooling, ear pain, facial swelling and hearing loss.    Eyes: Negative for photophobia and pain.   Respiratory: Negative for shortness of breath, wheezing and stridor.    Cardiovascular: Negative for chest pain.   Gastrointestinal: Negative for abdominal pain.   Musculoskeletal: Negative for back pain and gait problem.   Allergic/Immunologic: Negative for immunocompromised state.   Neurological: Negative for facial asymmetry, light-headedness, numbness and headaches.   Hematological: Does not bruise/bleed easily.   Psychiatric/Behavioral: Negative for agitation, behavioral problems and confusion.     Objective:     Vital Signs (Most Recent):  Temp: 98.2 °F (36.8 °C) (12/18/17 2228)  Pulse: 88 (12/18/17 2228)  Resp: 18 (12/18/17 2228)  BP: (!) 148/92 (12/18/17 2228)  SpO2: 98 % (12/18/17 2228) Vital Signs (24h Range):  Temp:  [97.6 °F (36.4 °C)-98.2 °F (36.8 °C)] 98.2 °F (36.8 °C)  Pulse:  [] 88  Resp:  [14-18] 18  SpO2:  [96 %-98 %] 98 %  BP: (130-148)/(89-95) 148/92        There is no height or weight on file to calculate BMI.         Physical Exam   Constitutional: Uncomfortable-appearing / communicating with muffled voice.  NAD.  Eyes: EOM I Bilaterally  Head/Face: Normocephalic.  Negative paranasal sinus pressure/tenderness.  Salivary glands WNL.  House  Brackmann I Bilaterally.  Nose: No gross nasal septal deviation. Inferior Turbinates 2+ bilaterally. No septal perforation. No masses/lesions. External nasal skin without masses/lesions.  Oral Cavity: Gingiva/lips WNL.  FOM Soft, no masses palpated. Oral Tongue mobile. Hard Palate WNL.   Oropharynx: Bilateral enlarged tonsils, R>L. No exudates present. Deviation of uvula to left. Moderate trismus  Neck/Lymphatic: No LAD I-VI bilaterally.  No thyromegaly.  No masses noted on exam.  Neuro/Psychiatric: AOx3.  Normal mood and affect.   Cardiovascular: Normal carotid pulses bilaterally, no increasing jugular venous distention noted at cervical region bilaterally.    Respiratory: Normal respiratory effort, no stridor, no retractions noted.        Significant Labs:  BMP: No results for input(s): GLU, CL, CO2, BUN, CREATININE, CALCIUM, MG in the last 168 hours.    Invalid input(s): SODIUM, POTASSIUM  CBC: No results for input(s): WBC, RBC, HGB, HCT, PLT, MCV, MCH, MCHC in the last 168 hours.    Significant Diagnostics:  CT Neck: Enlargement of both palatine tonsils, right more than left.  A small hypoattenuating area within the right peritonsillar region is suspicious for a phlegmon or early small forming abscess.      Assessment/Plan:     Peritonsillar abscess    26 yo woman presenting with right peritonsillar abscess.    -I&D performed at bedside. See below  -admit to ENT for IV abx  -decadron, pain control  -ok for regular diet  -will evaluate for clinical improvement.  -d/w Dr. Benito      Procedure: After informed consent was obtained, the patient's tonsillar pillars were numbed with hurricaine spray. Lidocaine with epinephrine 1:100,000 was injected along the right anterior tonsillar pillar. An 18 gauge needle was inserted and aspirated without return of pus. A linear incision was then made after anesthesia was achieved. Blunt dissection using hemostat opened the incision with return of 0cc pus. The patient tolerated  the procedure well. There were no apparent complications.             VTE Risk Mitigation         Ordered     Low Risk of VTE  Once      12/19/17 0159            Minerva Chun MD  Otorhinolaryngology-Head & Neck Surgery  [facility]

## 2017-12-20 VITALS
OXYGEN SATURATION: 95 % | HEART RATE: 66 BPM | TEMPERATURE: 97 F | DIASTOLIC BLOOD PRESSURE: 51 MMHG | RESPIRATION RATE: 18 BRPM | SYSTOLIC BLOOD PRESSURE: 104 MMHG | WEIGHT: 228.19 LBS | BODY MASS INDEX: 40.43 KG/M2 | HEIGHT: 63 IN

## 2017-12-20 PROCEDURE — 25000003 PHARM REV CODE 250: Performed by: OTOLARYNGOLOGY

## 2017-12-20 PROCEDURE — 25000003 PHARM REV CODE 250: Performed by: STUDENT IN AN ORGANIZED HEALTH CARE EDUCATION/TRAINING PROGRAM

## 2017-12-20 PROCEDURE — G0378 HOSPITAL OBSERVATION PER HR: HCPCS

## 2017-12-20 PROCEDURE — 63600175 PHARM REV CODE 636 W HCPCS: Performed by: OTOLARYNGOLOGY

## 2017-12-20 PROCEDURE — S0077 INJECTION, CLINDAMYCIN PHOSP: HCPCS | Performed by: OTOLARYNGOLOGY

## 2017-12-20 RX ORDER — CLINDAMYCIN HYDROCHLORIDE 300 MG/1
300 CAPSULE ORAL EVERY 8 HOURS
Qty: 30 CAPSULE | Refills: 0 | Status: SHIPPED | OUTPATIENT
Start: 2017-12-20 | End: 2017-12-30

## 2017-12-20 RX ORDER — IBUPROFEN 200 MG
200 TABLET ORAL EVERY 6 HOURS PRN
Status: DISCONTINUED | OUTPATIENT
Start: 2017-12-20 | End: 2017-12-20 | Stop reason: HOSPADM

## 2017-12-20 RX ORDER — HYDROCODONE BITARTRATE AND ACETAMINOPHEN 5; 325 MG/1; MG/1
1 TABLET ORAL EVERY 4 HOURS PRN
Qty: 20 TABLET | Refills: 0 | Status: SHIPPED | OUTPATIENT
Start: 2017-12-20 | End: 2018-04-17

## 2017-12-20 RX ORDER — ONDANSETRON 2 MG/ML
4 INJECTION INTRAMUSCULAR; INTRAVENOUS ONCE
Status: COMPLETED | OUTPATIENT
Start: 2017-12-20 | End: 2017-12-20

## 2017-12-20 RX ORDER — ONDANSETRON 8 MG/1
8 TABLET, ORALLY DISINTEGRATING ORAL EVERY 12 HOURS PRN
Qty: 20 TABLET | Refills: 0 | Status: SHIPPED | OUTPATIENT
Start: 2017-12-20 | End: 2018-04-17

## 2017-12-20 RX ADMIN — CLINDAMYCIN IN 5 PERCENT DEXTROSE 900 MG: 18 INJECTION, SOLUTION INTRAVENOUS at 11:12

## 2017-12-20 RX ADMIN — DEXAMETHASONE SODIUM PHOSPHATE 8 MG: 4 INJECTION, SOLUTION INTRAMUSCULAR; INTRAVENOUS at 06:12

## 2017-12-20 RX ADMIN — HYDROCODONE BITARTRATE AND ACETAMINOPHEN 1 TABLET: 5; 325 TABLET ORAL at 09:12

## 2017-12-20 RX ADMIN — HYDROCODONE BITARTRATE AND ACETAMINOPHEN 1 TABLET: 5; 325 TABLET ORAL at 02:12

## 2017-12-20 RX ADMIN — ONDANSETRON 4 MG: 2 INJECTION INTRAMUSCULAR; INTRAVENOUS at 11:12

## 2017-12-20 RX ADMIN — PROMETHAZINE HYDROCHLORIDE 12.5 MG: 12.5 TABLET ORAL at 09:12

## 2017-12-20 RX ADMIN — MORPHINE SULFATE 2 MG: 2 INJECTION, SOLUTION INTRAMUSCULAR; INTRAVENOUS at 06:12

## 2017-12-20 RX ADMIN — DEXAMETHASONE SODIUM PHOSPHATE 8 MG: 4 INJECTION, SOLUTION INTRAMUSCULAR; INTRAVENOUS at 02:12

## 2017-12-20 RX ADMIN — CLINDAMYCIN IN 5 PERCENT DEXTROSE 900 MG: 18 INJECTION, SOLUTION INTRAVENOUS at 02:12

## 2017-12-20 RX ADMIN — ONDANSETRON 8 MG: 2 INJECTION INTRAMUSCULAR; INTRAVENOUS at 06:12

## 2017-12-20 RX ADMIN — IBUPROFEN 200 MG: 200 TABLET, FILM COATED ORAL at 03:12

## 2017-12-20 RX ADMIN — DEXTROSE MONOHYDRATE, SODIUM CHLORIDE, AND POTASSIUM CHLORIDE: 50; 4.5; 1.49 INJECTION, SOLUTION INTRAVENOUS at 03:12

## 2017-12-20 NOTE — NURSING
Pt is complaining of nausea that was not relieved with phenergan PO , IV zofran is ordered Q8H and is not available until 1400.  Called and spoke to Minerva Chun regarding pt's nausea, orders given for IVP zofran 4 mg now.  Will continue to monitor as pt is supposed to be discharged home today.

## 2017-12-20 NOTE — NURSING
Paged Dr Lara regarding pts discharge.  Pt says that she would rather stay another day in hospital as she is nervous about bleeding and pain.  Pt says she can taste blood and that is what makes her nausea worsen.  Pt says that MD asked her this morning if she was ready to go home or if she would like to stay another day, pt is requesting to stay.  MD notified.

## 2017-12-20 NOTE — PROGRESS NOTES
[facility]  Otorhinolaryngology-Head & Neck Surgery  Progress Note    Subjective:     Post-Op Info:  * No surgery found *      Hospital Day: 2     Interval History: NAEON. Tolerated spaghetti yesterday. Reports improvement in throat pain. Voice less muffled.    Medications:  Continuous Infusions:   dextrose 5 % and 0.45 % NaCl with KCl 20 mEq 100 mL/hr at 12/20/17 0338     Scheduled Meds:   clindamycin 900 MG/50 ML D5W  900 mg Intravenous Q8H    dexamethasone  8 mg Intravenous Q8H     PRN Meds:hydrocodone-acetaminophen 5-325mg, ibuprofen, morphine, ondansetron, promethazine     Review of patient's allergies indicates:  No Known Allergies  Objective:     Vital Signs (24h Range):  Temp:  [97 °F (36.1 °C)-98.2 °F (36.8 °C)] 98.1 °F (36.7 °C)  Pulse:  [74-85] 75  Resp:  [17-18] 18  SpO2:  [94 %-95 %] 94 %  BP: (110-124)/(62-67) 124/67        Lines/Drains/Airways     Peripheral Intravenous Line                 Peripheral IV - Single Lumen 12/18/17 1520 Left Antecubital 1 day                Physical Exam   NAD, AAO  Breathing comfortably on RA  Oropharynx: bilateral tonsil hypertrophy, healing incision on right. No purulence, no exudates    Significant Labs:  None    Significant Diagnostics:  None    Assessment/Plan:     * Peritonsillar abscess    28 yo woman presenting with right peritonsillar abscess. Recovering well    -discharge home today  -clindamycin PO x 10 days  -RTC to discuss tonsillectomy in ~1 month  -d/w Dr. Jigna Chun MD  Otorhinolaryngology-Head & Neck Surgery  [facility]

## 2017-12-20 NOTE — DISCHARGE SUMMARY
[facility]  Otorhinolaryngology-Head & Neck Surgery  Discharge Summary      Patient Name: Gloria Nettles  MRN: [id number]  Admission Date: 12/19/2017  Hospital Length of Stay: 0 days  Discharge Date and Time:  12/20/2017 7:46 AM  Attending Physician: Allen Benito MD   Discharging Provider: Minerva Chun MD  Primary Care Provider: Kaitlyn Cespedes NP     HPI: Ms Nettles is a 26 yo female with no significant PMH who presented with 3 weeks of sore throat. She went to an urgent care three weeks ago where she was initially given Bicillin. She returned 12/1 with continued sore throat and fever, where she was given Bactrim DS. Today she returned, now with odynophagia and muffled voice. CT of the neckrevealed a possible right peritonsillar abscess. She was transferred to [facility] for ENT evaluation.  She denies any relief from the single dose of IV clindamycin she received. She has not eaten in the last day, difficulty drinking liquids today. No fevers. Denies SOB    * No surgery found *     Hospital Course: Patient admitted to ENT service and given IV clindamycin with gradual improvement in her symptoms over the following 24 hours. By time of discharge she was tolerating a regular diet with throat pain improved.    Consults: None    Significant Diagnostic Studies: none    Pending Diagnostic Studies:     None        Final Active Diagnoses:    Diagnosis Date Noted POA    PRINCIPAL PROBLEM:  Peritonsillar abscess [J36] 12/19/2017 Yes      Problems Resolved During this Admission:    Diagnosis Date Noted Date Resolved POA      Discharged Condition: good    Disposition: Home or Self Care    Follow Up:  Follow-up Information     Allen Benito MD In 1 month.    Specialty:  Otolaryngology  Why:  discuss tonsillectomy  Contact information:  [address]  [facility] [address]  [phone]                 Patient Instructions:     Diet general     Activity as tolerated     Call MD for:   severe uncontrolled pain     Call MD for:  difficulty breathing or increased cough       Medications:  Reconciled Home Medications:   Current Discharge Medication List      START taking these medications    Details   clindamycin (CLEOCIN) 300 MG capsule Take 1 capsule (300 mg total) by mouth every 8 (eight) hours.  Qty: 30 capsule, Refills: 0      hydrocodone-acetaminophen 5-325mg (NORCO) 5-325 mg per tablet Take 1 tablet by mouth every 4 (four) hours as needed.  Qty: 20 tablet, Refills: 0         CONTINUE these medications which have NOT CHANGED    Details   ibuprofen (ADVIL,MOTRIN) 800 MG tablet Take 1 tablet (800 mg total) by mouth every 8 (eight) hours as needed for Pain.  Qty: 30 tablet, Refills: 0      lidocaine HCl 2% (XYLOCAINE) 2 % Soln by Mucous Membrane route every 3 (three) hours.      potassium chloride SA (K-DUR,KLOR-CON) 20 MEQ tablet Take 1 tablet (20 mEq total) by mouth once daily.  Qty: 5 tablet, Refills: 0    Associated Diagnoses: Hypokalemia             Minerva Chun MD  Otorhinolaryngology-Head & Neck Surgery  [facility]

## 2017-12-20 NOTE — SUBJECTIVE & OBJECTIVE
Interval History: NAEON. Tolerated spaghetti yesterday. Reports improvement in throat pain. Voice less muffled.    Medications:  Continuous Infusions:   dextrose 5 % and 0.45 % NaCl with KCl 20 mEq 100 mL/hr at 12/20/17 0338     Scheduled Meds:   clindamycin 900 MG/50 ML D5W  900 mg Intravenous Q8H    dexamethasone  8 mg Intravenous Q8H     PRN Meds:hydrocodone-acetaminophen 5-325mg, ibuprofen, morphine, ondansetron, promethazine     Review of patient's allergies indicates:  No Known Allergies  Objective:     Vital Signs (24h Range):  Temp:  [97 °F (36.1 °C)-98.2 °F (36.8 °C)] 98.1 °F (36.7 °C)  Pulse:  [74-85] 75  Resp:  [17-18] 18  SpO2:  [94 %-95 %] 94 %  BP: (110-124)/(62-67) 124/67        Lines/Drains/Airways     Peripheral Intravenous Line                 Peripheral IV - Single Lumen 12/18/17 1520 Left Antecubital 1 day                Physical Exam   NAD, AAO  Breathing comfortably on RA  Oropharynx: bilateral tonsil hypertrophy, healing incision on right. No purulence, no exudates    Significant Labs:  None    Significant Diagnostics:  None

## 2017-12-20 NOTE — ASSESSMENT & PLAN NOTE
28 yo woman presenting with right peritonsillar abscess. Recovering well    -discharge home today  -clindamycin PO x 10 days  -RTC to discuss tonsillectomy in ~1 month  -d/w Dr. eBnito

## 2017-12-20 NOTE — NURSING
Spoke to  ENT MD regarding pts concerns.  Pt is nauseated and tastes blood in her mouth.  Pt does not want to go home because she will be alone all night. MD says that they will come talk to pt and discuss her concerns.  Will continue to monitor.

## 2017-12-20 NOTE — NURSING
Pt is still feeling nauseated and does not want to go home today.  Paged Dr Blas to notify her, waiting for call back

## 2017-12-20 NOTE — PLAN OF CARE
Problem: Patient Care Overview  Goal: Plan of Care Review  Outcome: Ongoing (interventions implemented as appropriate)  Patient AAOx4. Patient VSS. Patient complains of pain in neck and nausea. Pain and nausea managed with PRN medications. Patient free from falls or injury during shift. Patient repositioned freely every two hours or more. Patient in bed, bed in lowest position, call light in reach, bed alarm set, and personal items at bedside. Will continue to monitor.

## 2017-12-21 ENCOUNTER — TELEPHONE (OUTPATIENT)
Dept: EMERGENCY MEDICINE | Facility: OTHER | Age: 27
End: 2017-12-21

## 2017-12-21 NOTE — NURSING
Pt is discharged and waiting for her ride home.  Prescriptions and follow up appointments given to pt.

## 2017-12-21 NOTE — TELEPHONE ENCOUNTER
----- Message from Ольга Rankin MD sent at 12/20/2017  8:35 AM CST -----  Please place pt on flagyl 500mg po bidx7d. Thanks, cliles  ----- Message -----  From: Raymundo Mercado MD  Sent: 12/20/2017   7:29 AM  To: Criss Calabrese, Ольга Rankin MD    Flagyl 500 mg po tid for 7 days

## 2017-12-22 NOTE — PLAN OF CARE
Patient discharged home with no needs 12/20/2017.       12/22/17 1129   Final Note   Assessment Type Final Discharge Note   Discharge Disposition Home   Right Care Referral Info   Post Acute Recommendation No Care

## 2018-01-02 ENCOUNTER — TELEPHONE (OUTPATIENT)
Dept: OTOLARYNGOLOGY | Facility: CLINIC | Age: 28
End: 2018-01-02

## 2018-03-08 ENCOUNTER — TELEPHONE (OUTPATIENT)
Dept: ADMINISTRATIVE | Facility: HOSPITAL | Age: 28
End: 2018-03-08

## 2018-05-21 ENCOUNTER — HOSPITAL ENCOUNTER (EMERGENCY)
Facility: HOSPITAL | Age: 28
Discharge: HOME | End: 2018-05-21
Attending: EMERGENCY MEDICINE
Payer: MEDICAID

## 2018-05-21 VITALS
WEIGHT: 215 LBS | BODY MASS INDEX: 36.7 KG/M2 | SYSTOLIC BLOOD PRESSURE: 115 MMHG | RESPIRATION RATE: 18 BRPM | DIASTOLIC BLOOD PRESSURE: 76 MMHG | TEMPERATURE: 98 F | HEART RATE: 87 BPM | OXYGEN SATURATION: 97 % | HEIGHT: 64 IN

## 2018-05-21 DIAGNOSIS — R51.9 NONINTRACTABLE EPISODIC HEADACHE, UNSPECIFIED HEADACHE TYPE: Primary | ICD-10-CM

## 2018-05-21 LAB
B-HCG UR QL: NEGATIVE
CTP QC/QA: YES

## 2018-05-21 PROCEDURE — 96372 THER/PROPH/DIAG INJ SC/IM: CPT

## 2018-05-21 PROCEDURE — 63600175 PHARM REV CODE 636 W HCPCS: Performed by: EMERGENCY MEDICINE

## 2018-05-21 PROCEDURE — 99283 EMERGENCY DEPT VISIT LOW MDM: CPT | Mod: 25

## 2018-05-21 PROCEDURE — 81025 URINE PREGNANCY TEST: CPT | Performed by: EMERGENCY MEDICINE

## 2018-05-21 RX ORDER — KETOROLAC TROMETHAMINE 10 MG/1
10 TABLET, FILM COATED ORAL EVERY 6 HOURS
Qty: 20 TABLET | Refills: 0 | OUTPATIENT
Start: 2018-05-21 | End: 2018-12-02

## 2018-05-21 RX ORDER — KETOROLAC TROMETHAMINE 30 MG/ML
60 INJECTION, SOLUTION INTRAMUSCULAR; INTRAVENOUS
Status: COMPLETED | OUTPATIENT
Start: 2018-05-21 | End: 2018-05-21

## 2018-05-21 RX ORDER — BUTALBITAL, ACETAMINOPHEN AND CAFFEINE 50; 325; 40 MG/1; MG/1; MG/1
1 TABLET ORAL EVERY 4 HOURS PRN
Qty: 15 TABLET | Refills: 0 | Status: SHIPPED | OUTPATIENT
Start: 2018-05-21 | End: 2018-06-20

## 2018-05-21 RX ADMIN — KETOROLAC TROMETHAMINE 60 MG: 30 INJECTION, SOLUTION INTRAMUSCULAR; INTRAVENOUS at 03:05

## 2018-05-21 NOTE — ED PROVIDER NOTES
Encounter Date: 5/21/2018       History     Chief Complaint   Patient presents with    Headache     pt reports having a headache and nausea x 3 days. pt reports taking OTC medicine for pain but has not helped. pt denies vomiting or diarrhea.     Chief complaint:  Headache  28-year-old complains of throbbing pain to the left side of her head.  The pain has been intermittent for the last 3 days.  The pain is worsened by the light.  She takes over-the-counter medicines with improvement.  She denies nausea, vomiting, fever, neck pain or visual changes.  Patient says she has history of migraines and gets these headaches periodically.  She rates the pain as 7/10      The history is provided by the patient.     Review of patient's allergies indicates:  No Known Allergies  History reviewed. No pertinent past medical history.  Past Surgical History:   Procedure Laterality Date    CERVICAL BIOPSY  2017     Family History   Problem Relation Age of Onset    Diabetes Mother     Thyroid disease Father     Ovarian cancer Maternal Aunt      Social History   Substance Use Topics    Smoking status: Former Smoker     Years: 2.00    Smokeless tobacco: Never Used      Comment: 2 cigarettes    Alcohol use Yes      Comment: social     Review of Systems   Constitutional: Negative for fever.   HENT: Negative for sinus pressure.    Eyes: Positive for photophobia. Negative for visual disturbance.   Gastrointestinal: Negative for vomiting.   Musculoskeletal: Negative for neck pain and neck stiffness.   Neurological: Positive for headaches.   All other systems reviewed and are negative.      Physical Exam     Initial Vitals [05/21/18 1525]   BP Pulse Resp Temp SpO2   127/87 92 18 98.1 °F (36.7 °C) 99 %      MAP       100.33         Physical Exam    Nursing note and vitals reviewed.  Constitutional: She appears well-developed and well-nourished.   HENT:   Head: Normocephalic and atraumatic.   Right Ear: Tympanic membrane normal.   Left  Ear: Tympanic membrane normal.   Mouth/Throat: Uvula is midline and oropharynx is clear and moist.   Eyes: Conjunctivae and EOM are normal. Pupils are equal, round, and reactive to light.   Neck: Normal range of motion. Neck supple.   Cardiovascular: Normal rate and regular rhythm.   Pulmonary/Chest: Breath sounds normal.   Abdominal: Soft. There is no tenderness. There is no rebound and no guarding.   Musculoskeletal: Normal range of motion.   Neurological: She is alert and oriented to person, place, and time. She has normal strength. No cranial nerve deficit.   Skin: Skin is warm and dry.   Psychiatric: She has a normal mood and affect.         ED Course   Procedures  Labs Reviewed   POCT URINE PREGNANCY             Medical Decision Making:   Initial Assessment:   28-year-old who complains throbbing pain to the left side of her head.  On exam patient appears well.  She is afebrile and has no neurological deficits.  No nuchal rigidity  ED Management:  Patient was treated with Toradol in the ED.  She will be prescribed Toradol and fioricet                      Clinical Impression:   The encounter diagnosis was Nonintractable episodic headache, unspecified headache type.                           Sonia Dunne MD  05/21/18 0706

## 2018-12-02 ENCOUNTER — HOSPITAL ENCOUNTER (EMERGENCY)
Facility: HOSPITAL | Age: 28
Discharge: HOME | End: 2018-12-02
Attending: INTERNAL MEDICINE
Payer: MEDICAID

## 2018-12-02 VITALS
DIASTOLIC BLOOD PRESSURE: 65 MMHG | WEIGHT: 235 LBS | HEART RATE: 86 BPM | RESPIRATION RATE: 20 BRPM | SYSTOLIC BLOOD PRESSURE: 104 MMHG | OXYGEN SATURATION: 97 % | BODY MASS INDEX: 40.12 KG/M2 | HEIGHT: 64 IN | TEMPERATURE: 98 F

## 2018-12-02 DIAGNOSIS — Z3A.27 27 WEEKS GESTATION OF PREGNANCY: Primary | ICD-10-CM

## 2018-12-02 DIAGNOSIS — N30.00 ACUTE CYSTITIS WITHOUT HEMATURIA: ICD-10-CM

## 2018-12-02 LAB
ALBUMIN SERPL-MCNC: 3 G/DL (ref 3.3–5.5)
ALP SERPL-CCNC: 81 U/L (ref 42–141)
B-HCG UR QL: POSITIVE
BILIRUB SERPL-MCNC: 0.4 MG/DL (ref 0.2–1.6)
BILIRUBIN, POC UA: ABNORMAL
BLOOD, POC UA: NEGATIVE
BUN SERPL-MCNC: 6 MG/DL (ref 7–22)
CALCIUM SERPL-MCNC: 8.8 MG/DL (ref 8–10.3)
CHLORIDE SERPL-SCNC: 104 MMOL/L (ref 98–108)
CLARITY, POC UA: CLEAR
COLOR, POC UA: YELLOW
CREAT SERPL-MCNC: 0.7 MG/DL (ref 0.6–1.2)
CTP QC/QA: YES
GLUCOSE SERPL-MCNC: 92 MG/DL (ref 73–118)
GLUCOSE, POC UA: NEGATIVE
KETONES, POC UA: ABNORMAL
LEUKOCYTE EST, POC UA: ABNORMAL
NITRITE, POC UA: NEGATIVE
PH UR STRIP: 6.5 [PH]
POC ALT (SGPT): 18 U/L (ref 10–47)
POC AST (SGOT): 23 U/L (ref 11–38)
POC TCO2: 24 MMOL/L (ref 18–33)
POTASSIUM BLD-SCNC: 3.1 MMOL/L (ref 3.6–5.1)
PROTEIN, POC UA: ABNORMAL
PROTEIN, POC: 7.1 G/DL (ref 6.4–8.1)
SODIUM BLD-SCNC: 139 MMOL/L (ref 128–145)
SPECIFIC GRAVITY, POC UA: >=1.03
UROBILINOGEN, POC UA: 0.2 E.U./DL

## 2018-12-02 PROCEDURE — 96360 HYDRATION IV INFUSION INIT: CPT

## 2018-12-02 PROCEDURE — 63600175 PHARM REV CODE 636 W HCPCS: Performed by: INTERNAL MEDICINE

## 2018-12-02 PROCEDURE — 81025 URINE PREGNANCY TEST: CPT | Performed by: PHYSICIAN ASSISTANT

## 2018-12-02 PROCEDURE — 25000003 PHARM REV CODE 250: Performed by: INTERNAL MEDICINE

## 2018-12-02 PROCEDURE — 85025 COMPLETE CBC W/AUTO DIFF WBC: CPT

## 2018-12-02 PROCEDURE — 99284 EMERGENCY DEPT VISIT MOD MDM: CPT | Mod: 25

## 2018-12-02 PROCEDURE — 96372 THER/PROPH/DIAG INJ SC/IM: CPT

## 2018-12-02 PROCEDURE — 81003 URINALYSIS AUTO W/O SCOPE: CPT

## 2018-12-02 PROCEDURE — 80053 COMPREHEN METABOLIC PANEL: CPT

## 2018-12-02 PROCEDURE — 25000003 PHARM REV CODE 250: Performed by: PHYSICIAN ASSISTANT

## 2018-12-02 RX ORDER — SODIUM CHLORIDE 9 MG/ML
1000 INJECTION, SOLUTION INTRAVENOUS ONCE
Status: COMPLETED | OUTPATIENT
Start: 2018-12-02 | End: 2018-12-02

## 2018-12-02 RX ORDER — CEFTRIAXONE 1 G/1
1 INJECTION, POWDER, FOR SOLUTION INTRAMUSCULAR; INTRAVENOUS
Status: COMPLETED | OUTPATIENT
Start: 2018-12-02 | End: 2018-12-02

## 2018-12-02 RX ORDER — ONDANSETRON 4 MG/1
8 TABLET, ORALLY DISINTEGRATING ORAL
Status: COMPLETED | OUTPATIENT
Start: 2018-12-02 | End: 2018-12-02

## 2018-12-02 RX ORDER — CEPHALEXIN 500 MG/1
500 CAPSULE ORAL EVERY 12 HOURS
Qty: 20 CAPSULE | Refills: 0 | Status: SHIPPED | OUTPATIENT
Start: 2018-12-02 | End: 2018-12-12

## 2018-12-02 RX ORDER — ONDANSETRON 4 MG/1
4 TABLET, FILM COATED ORAL EVERY 6 HOURS PRN
Qty: 12 TABLET | Refills: 0 | Status: SHIPPED | OUTPATIENT
Start: 2018-12-02 | End: 2019-05-02

## 2018-12-02 RX ADMIN — SODIUM CHLORIDE 1000 ML: 0.9 INJECTION, SOLUTION INTRAVENOUS at 08:12

## 2018-12-02 RX ADMIN — ONDANSETRON 8 MG: 4 TABLET, ORALLY DISINTEGRATING ORAL at 08:12

## 2018-12-02 RX ADMIN — CEFTRIAXONE SODIUM 1 G: 1 INJECTION, POWDER, FOR SOLUTION INTRAMUSCULAR; INTRAVENOUS at 09:12

## 2018-12-03 NOTE — ED PROVIDER NOTES
Encounter Date: 12/2/2018    SCRIBE #1 NOTE: I, Rock Pablo, am scribing for, and in the presence of,  Dr. Alvarez . I have scribed the following portions of the note - Other sections scribed: HPI, ROS, PE.       History     Chief Complaint   Patient presents with    Dizziness     pt presents with c/o lightheadedness, N/V x2 hrs; reports 4-5 episodes of vomiting; pt actively vomiting in triage, 500mL of yellow emesis; pt is currently 27 wks; FRAN = 03/04/2019; Dr Ngo at [facility]     This is a 28 y.o. female, who is 27 weeks pregnant, who presents to the ED with a complaint of dizziness that began two hours ago. Pt reports nausea and five episodes of emesis within the past two hours. She denies treatments or medication for her symptoms. Moving and sitting up worsens her dizziness. Lying still provides relief. She denies fever, chills, CP, SOB, abdominal pain, diarrhea, or back pain.      The history is provided by the patient.     Review of patient's allergies indicates:  No Known Allergies  No past medical history on file.  Past Surgical History:   Procedure Laterality Date    CERVICAL BIOPSY  2017     Family History   Problem Relation Age of Onset    Diabetes Mother     Thyroid disease Father     Ovarian cancer Maternal Aunt      Social History     Tobacco Use    Smoking status: Former Smoker     Years: 2.00    Smokeless tobacco: Never Used    Tobacco comment: 2 cigarettes   Substance Use Topics    Alcohol use: Yes     Comment: social    Drug use: No     Review of Systems   Constitutional: Negative for chills and fever.   Respiratory: Negative for shortness of breath.    Cardiovascular: Negative for chest pain.   Gastrointestinal: Positive for nausea and vomiting. Negative for abdominal pain and diarrhea.   Musculoskeletal: Negative for back pain.   Neurological: Positive for dizziness and light-headedness.   All other systems reviewed and are negative.      Physical Exam     Initial Vitals    BP Pulse Resp Temp SpO2   12/02/18 2005 12/02/18 2005 12/02/18 2005 12/02/18 2009 12/02/18 2005   (!) 143/76 108 20 98 °F (36.7 °C) 96 %      MAP       --                Physical Exam    Nursing note and vitals reviewed.  Constitutional: She appears well-developed and well-nourished.   HENT:   Head: Normocephalic and atraumatic.   Right Ear: External ear normal.   Left Ear: External ear normal.   Nose: Nose normal.   Eyes: Conjunctivae are normal.   Neck: Normal range of motion. Neck supple.   Cardiovascular: Normal rate, regular rhythm, normal heart sounds and intact distal pulses. Exam reveals no gallop and no friction rub.    No murmur heard.  Pulses:       Radial pulses are 2+ on the right side, and 2+ on the left side.   Pulmonary/Chest: Breath sounds normal. No respiratory distress. She has no wheezes. She has no rhonchi. She has no rales. She exhibits no tenderness.   Abdominal: Soft. Bowel sounds are normal. She exhibits distension (Gravid uterus). There is no tenderness. There is no rebound and no guarding.   Musculoskeletal: Normal range of motion.   Neurological: She is alert and oriented to person, place, and time.   Skin: Skin is warm and dry. Capillary refill takes less than 2 seconds. No rash noted.   Psychiatric: She has a normal mood and affect. Her behavior is normal.         ED Course   Procedures  Labs Reviewed   POCT URINE PREGNANCY - Abnormal; Notable for the following components:       Result Value    POC Preg Test, Ur Positive (*)     All other components within normal limits   POCT URINALYSIS W/O SCOPE - Abnormal; Notable for the following components:    Glucose, UA Negative (*)     Bilirubin, UA 1+ (*)     Ketones, UA 1+ (*)     Spec Grav UA >=1.030 (*)     Blood, UA Negative (*)     Protein, UA 3+ (*)     Nitrite, UA Negative (*)     Leukocytes, UA Trace (*)     All other components within normal limits   POCT URINALYSIS W/O SCOPE   POCT CBC   POCT GLUCOSE MONITORING CONTINUOUS   POCT  CMP          Imaging Results    None          Medical Decision Making:   Initial Assessment:   This is a 28 y.o. female, who is 27 weeks pregnant, who presents to the ED with a complaint of dizziness that began two hours ago. Pt reports nausea and five episodes of emesis within the past two hours. She denies treatments or medication for her symptoms. Moving and sitting up worsens her dizziness. Lying still provides relief. She denies fever, chills, CP, SOB, abdominal pain, diarrhea, or back pain.  Clinical Tests:   Lab Tests: Ordered and Reviewed  ED Management:  Patient was given normal saline bolus as well as Zofran.  Urinalysis reveals acute cystitis without hematuria.  She is given instructions for acute cystitis and pregnancy, received Rocephin in the emergency department and was given a prescription for Keflex.  She was advised to follow up with her PCP within the next 2 days for re-evaluation and to return to the emergency department if condition worsens.            Scribe Attestation:   Scribe #1: I performed the above scribed service and the documentation accurately describes the services I performed. I attest to the accuracy of the note.    This document was produced by a scribe under my direction and in my presence. I agree with the content of the note and have made any necessary edits.     Dr. Alvarez    12/02/2018 9:42 PM             Clinical Impression:     1. 27 weeks gestation of pregnancy    2. Acute cystitis without hematuria            Disposition:   Disposition: Discharged  Condition: Stable                        Diogenes Alvarez MD  12/02/18 2618

## 2019-01-23 PROBLEM — O13.3 TRANSIENT HYPERTENSION OF PREGNANCY IN THIRD TRIMESTER: Status: ACTIVE | Noted: 2019-01-23

## 2019-02-05 PROBLEM — O99.820 GROUP B STREPTOCOCCAL CARRIAGE COMPLICATING PREGNANCY: Status: ACTIVE | Noted: 2019-02-05

## 2019-02-05 PROBLEM — Z67.41 TYPE O BLOOD, RH NEGATIVE: Status: ACTIVE | Noted: 2019-02-05

## 2019-02-23 PROBLEM — Z37.9 NORMAL LABOR: Status: ACTIVE | Noted: 2019-02-23

## 2019-02-23 PROBLEM — O47.9 UTERINE CONTRACTIONS DURING PREGNANCY: Status: RESOLVED | Noted: 2019-02-23 | Resolved: 2019-02-23

## 2019-02-23 PROBLEM — O47.9 UTERINE CONTRACTIONS DURING PREGNANCY: Status: ACTIVE | Noted: 2019-02-23

## 2019-02-23 PROBLEM — O14.93 PRE-ECLAMPSIA IN THIRD TRIMESTER: Chronic | Status: ACTIVE | Noted: 2019-02-23

## 2019-02-26 PROBLEM — Z30.2 ADMISSION FOR STERILIZATION: Status: ACTIVE | Noted: 2019-02-26

## 2019-04-29 PROBLEM — O13.3 TRANSIENT HYPERTENSION OF PREGNANCY IN THIRD TRIMESTER: Status: RESOLVED | Noted: 2019-01-23 | Resolved: 2019-04-29

## 2019-05-02 ENCOUNTER — HOSPITAL ENCOUNTER (EMERGENCY)
Facility: HOSPITAL | Age: 29
Discharge: HOME | End: 2019-05-02
Attending: EMERGENCY MEDICINE
Payer: MEDICAID

## 2019-05-02 VITALS
DIASTOLIC BLOOD PRESSURE: 98 MMHG | HEIGHT: 64 IN | BODY MASS INDEX: 38.58 KG/M2 | RESPIRATION RATE: 18 BRPM | TEMPERATURE: 98 F | SYSTOLIC BLOOD PRESSURE: 159 MMHG | WEIGHT: 226 LBS | OXYGEN SATURATION: 100 % | HEART RATE: 72 BPM

## 2019-05-02 DIAGNOSIS — I16.0 HYPERTENSIVE URGENCY: ICD-10-CM

## 2019-05-02 LAB
ALBUMIN SERPL-MCNC: 3.3 G/DL
ALP SERPL-CCNC: 77 U/L
B-HCG UR QL: NEGATIVE
BILIRUB SERPL-MCNC: 0.5 MG/DL
BUN SERPL-MCNC: 10 MG/DL
CALCIUM SERPL-MCNC: 9.1 MG/DL
CHLORIDE SERPL-SCNC: 106 MMOL/L
CREAT SERPL-MCNC: 0.7 MG/DL
CTP QC/QA: YES
GLUCOSE SERPL-MCNC: 98 MG/DL (ref 70–110)
POC ALT (SGPT): 19 U/L
POC AST (SGOT): 26 U/L
POC B-TYPE NATRIURETIC PEPTIDE: 8.2 PG/ML
POC CARDIAC TROPONIN I: 0 NG/ML
POC TCO2: 27 MMOL/L
POTASSIUM BLD-SCNC: 4 MMOL/L
PROTEIN, POC: 7.5 G/DL
SAMPLE: NORMAL
SODIUM BLD-SCNC: 141 MMOL/L

## 2019-05-02 PROCEDURE — 93005 ELECTROCARDIOGRAM TRACING: CPT | Mod: ER

## 2019-05-02 PROCEDURE — 81025 URINE PREGNANCY TEST: CPT | Mod: ER | Performed by: EMERGENCY MEDICINE

## 2019-05-02 PROCEDURE — 84484 ASSAY OF TROPONIN QUANT: CPT | Mod: ER

## 2019-05-02 PROCEDURE — 93010 ELECTROCARDIOGRAM REPORT: CPT | Mod: ,,, | Performed by: INTERNAL MEDICINE

## 2019-05-02 PROCEDURE — 99285 EMERGENCY DEPT VISIT HI MDM: CPT | Mod: 25,ER

## 2019-05-02 PROCEDURE — 80053 COMPREHEN METABOLIC PANEL: CPT | Mod: ER

## 2019-05-02 PROCEDURE — 93010 EKG 12-LEAD: ICD-10-PCS | Mod: ,,, | Performed by: INTERNAL MEDICINE

## 2019-05-02 PROCEDURE — 83880 ASSAY OF NATRIURETIC PEPTIDE: CPT | Mod: ER

## 2019-05-02 PROCEDURE — 85025 COMPLETE CBC W/AUTO DIFF WBC: CPT | Mod: ER

## 2019-05-02 PROCEDURE — 25000003 PHARM REV CODE 250: Mod: ER | Performed by: NURSE PRACTITIONER

## 2019-05-02 RX ORDER — CITALOPRAM 10 MG/1
10 TABLET ORAL DAILY
COMMUNITY
End: 2019-07-16

## 2019-05-02 RX ORDER — AMLODIPINE BESYLATE 10 MG/1
10 TABLET ORAL DAILY
Qty: 30 TABLET | Refills: 0 | Status: SHIPPED | OUTPATIENT
Start: 2019-05-02 | End: 2019-07-16

## 2019-05-02 RX ORDER — AMLODIPINE BESYLATE 5 MG/1
10 TABLET ORAL
Status: COMPLETED | OUTPATIENT
Start: 2019-05-02 | End: 2019-05-02

## 2019-05-02 RX ADMIN — AMLODIPINE BESYLATE 10 MG: 5 TABLET ORAL at 11:05

## 2019-05-02 NOTE — ED PROVIDER NOTES
Encounter Date: 5/2/2019    SCRIBE #1 NOTE: I, Nicole Hallman, am scribing for, and in the presence of,  Toussaint Battley, FNP. I have scribed the following portions of the note - Other sections scribed: HPI, ROS, PE.       History     Chief Complaint   Patient presents with    Headache     pt states that yesterday she felt dizzy with a ha and noticed her b/p was elevated. today pt states she just has the ha but her pressure is still elevated.      April Taylor Nettles is a 29 y.o. female who presents to the ED complaining of headache with associated dizziness since yesterday. Dizziness is resolved. Diagnosed with HTN by OBGYN after pregnancy, but never placed on medication. She states her BP was edwin today.    The history is provided by the patient. No  was used.   Headache    This is a new problem. The current episode started yesterday. The problem occurs constantly. The problem has been unchanged. The pain is located in the bilateral region. The pain does not radiate. The pain quality is similar to prior headaches. Associated symptoms include dizziness (resolved). Pertinent negatives include no fever, nausea, sore throat or vomiting. Her past medical history is significant for hypertension.     Review of patient's allergies indicates:  No Known Allergies  Past Medical History:   Diagnosis Date    Depression     Hypertension     during pregnancy     Past Surgical History:   Procedure Laterality Date    CERVICAL BIOPSY  2017     Family History   Problem Relation Age of Onset    Diabetes Mother     Thyroid disease Father     Ovarian cancer Maternal Aunt      Social History     Tobacco Use    Smoking status: Former Smoker     Years: 2.00    Smokeless tobacco: Never Used    Tobacco comment: 2 cigarettes   Substance Use Topics    Alcohol use: No     Frequency: Never    Drug use: No     Review of Systems   Constitutional: Negative.  Negative for fever.   HENT: Negative.  Negative  for sore throat.    Eyes: Negative.    Respiratory: Negative.  Negative for shortness of breath.    Cardiovascular: Negative.  Negative for chest pain.   Gastrointestinal: Negative.  Negative for nausea and vomiting.   Endocrine: Negative.    Genitourinary: Negative.  Negative for dysuria.   Musculoskeletal: Negative.  Negative for myalgias.   Skin: Negative.  Negative for rash.   Allergic/Immunologic: Negative.    Neurological: Positive for dizziness (resolved) and headaches.   Hematological: Negative.  Negative for adenopathy.   Psychiatric/Behavioral: Negative.  Negative for behavioral problems.   All other systems reviewed and are negative.      Physical Exam     Initial Vitals [05/02/19 1022]   BP Pulse Resp Temp SpO2   (!) 154/111 87 18 98.3 °F (36.8 °C) 99 %      MAP       --         Physical Exam    Nursing note and vitals reviewed.  Constitutional: She appears well-developed. She is Obese .   HENT:   Head: Normocephalic and atraumatic.   Right Ear: External ear normal.   Left Ear: External ear normal.   Nose: Nose normal.   Mouth/Throat: Uvula is midline, oropharynx is clear and moist and mucous membranes are normal. No trismus in the jaw.   Eyes: Conjunctivae and EOM are normal. Pupils are equal, round, and reactive to light.   Neck: Normal range of motion and phonation normal. Neck supple.   Cardiovascular: Normal rate, regular rhythm, normal heart sounds and intact distal pulses. Exam reveals no gallop and no friction rub.    No murmur heard.  Pulmonary/Chest: Effort normal and breath sounds normal. No stridor. No respiratory distress. She has no wheezes. She has no rhonchi. She has no rales. She exhibits no tenderness.   Abdominal: Soft. Bowel sounds are normal. She exhibits no distension. There is no tenderness. There is no rigidity, no rebound and no guarding.   Musculoskeletal: Normal range of motion. She exhibits no edema or tenderness.   Neurological: She is alert and oriented to person, place, and  time. She has normal strength. No cranial nerve deficit or sensory deficit. GCS score is 15. GCS eye subscore is 4. GCS verbal subscore is 5. GCS motor subscore is 6.   Skin: Skin is warm and dry. Capillary refill takes less than 2 seconds. No rash noted.   Psychiatric: She has a normal mood and affect. Her behavior is normal.         ED Course   Procedures  Labs Reviewed   TROPONIN ISTAT   POCT URINE PREGNANCY   POCT CBC   POCT CMP   POCT TROPONIN   POCT B-TYPE NATRIURETIC PEPTIDE (BNP)   POCT CMP   POCT B-TYPE NATRIURETIC PEPTIDE (BNP)     EKG Readings: (Independently Interpreted)   Initial Reading: No STEMI. Rhythm: Normal Sinus Rhythm. Heart Rate: 82. Ectopy: No Ectopy. Conduction: Normal. ST Segments: Normal ST Segments. T Waves: Normal. Axis: Normal. Clinical Impression: Normal Sinus Rhythm     ECG Results          EKG 12-lead (In process)  Result time 05/02/19 10:43:30    In process by Interface, Lab In [facility] (05/02/19 10:43:30)                 Narrative:    Test Reason : R51    Vent. Rate : 082 BPM     Atrial Rate : 082 BPM     P-R Int : 174 ms          QRS Dur : 080 ms      QT Int : 382 ms       P-R-T Axes : 062 020 041 degrees     QTc Int : 446 ms    Normal sinus rhythm  Normal ECG  No previous ECGs available    Referred By: [email or web address]   SELF           Confirmed By:                             Imaging Results          CT Head Without Contrast (Final result)  Result time 05/02/19 11:05:23    Final result by Duncan Paul MD (05/02/19 11:05:23)                 Impression:      No acute intracranial abnormality.      Electronically signed by: Duncan Paul MD  Date:    05/02/2019  Time:    11:05             Narrative:    EXAMINATION:  CT HEAD WITHOUT CONTRAST    CLINICAL HISTORY:  Dizziness;Headache, acute, norm neuro exam;hypertensive urgency;    TECHNIQUE:  Low dose axial images were obtained through the head.  Coronal and sagittal reformations were also performed. Contrast was not  administered.    COMPARISON:  None.    FINDINGS:  No acute intracranial hemorrhage or mass effect.  No extra-axial fluid collections.  There is normal gray-white differentiation.  No mass effect.  The sulci and ventricles are unremarkable.  Visualized portions the paranasal sinuses and mastoid air cells are clear.                               X-Ray Chest PA And Lateral (Final result)  Result time 05/02/19 10:59:49    Final result by Earle Perez MD (05/02/19 10:59:49)                 Impression:      1. No acute cardiopulmonary process appreciated.      Electronically signed by: Earle Perez  Date:    05/02/2019  Time:    10:59             Narrative:    EXAMINATION:  XR CHEST PA AND LATERAL    CLINICAL HISTORY:  Hypertensive urgency    TECHNIQUE:  PA and lateral views of the chest were performed.    COMPARISON:  None    FINDINGS:  Cardiomediastinal silhouette is within normal limits.    No focal consolidation, overt interstitial edema, sizable pleural effusion or pneumothorax.    Imaged osseous structures are grossly unremarkable.                                 Medical Decision Making:   Initial Assessment:   Hypertensive urgency  Differential Diagnosis:   Intracranial abnormality, cardiac abnormality, end-organ damage  Independently Interpreted Test(s):   I have ordered and independently interpreted EKG Reading(s) - see prior notes  Clinical Tests:   Lab Tests: Ordered and Reviewed  Radiological Study: Ordered and Reviewed  Medical Tests: Ordered and Reviewed  ED Management:  Norvasc given to the patient in the ER.  Repeat blood pressure is 159/98.  Imaging reveals no abnormality and lab reveals no and/or damage in EKG reveals normal sinus rhythm. Patient will be discharged home on Norvasc with instructions to follow up with her primary care provider tomorrow and return to the ER as needed if symptoms worsen or fail to improve.  Patient verbalized understanding of discharge instruction treatment plan.             Scribe Attestation:   Scribe #1: I performed the above scribed service and the documentation accurately describes the services I performed. I attest to the accuracy of the note.               Clinical Impression:     1. Hypertensive urgency                                   Toussaint Battley III, [facility]  05/02/19 [id number]

## 2019-05-27 PROBLEM — O14.93 PRE-ECLAMPSIA IN THIRD TRIMESTER: Chronic | Status: RESOLVED | Noted: 2019-02-23 | Resolved: 2019-05-27

## 2019-07-16 PROBLEM — J36 PERITONSILLAR ABSCESS: Status: RESOLVED | Noted: 2017-12-19 | Resolved: 2019-07-16

## 2019-07-16 PROBLEM — N30.00 ACUTE CYSTITIS WITHOUT HEMATURIA: Status: RESOLVED | Noted: 2018-12-02 | Resolved: 2019-07-16

## 2019-07-16 PROBLEM — O47.9 UTERINE CONTRACTIONS DURING PREGNANCY: Status: RESOLVED | Noted: 2019-02-23 | Resolved: 2019-07-16

## 2019-07-16 PROBLEM — O99.820 GROUP B STREPTOCOCCAL CARRIAGE COMPLICATING PREGNANCY: Status: RESOLVED | Noted: 2019-02-05 | Resolved: 2019-07-16

## 2019-07-16 PROBLEM — J02.9 PHARYNGITIS: Status: RESOLVED | Noted: 2017-12-01 | Resolved: 2019-07-16

## 2019-07-25 PROBLEM — Z30.2 ENCOUNTER FOR FEMALE STERILIZATION PROCEDURE: Status: ACTIVE | Noted: 2019-07-25

## 2019-12-03 PROBLEM — R22.0 SCALP MASS: Status: ACTIVE | Noted: 2019-12-03

## 2021-05-06 ENCOUNTER — PATIENT MESSAGE (OUTPATIENT)
Dept: RESEARCH | Facility: HOSPITAL | Age: 31
End: 2021-05-06

## 2021-08-21 ENCOUNTER — HOSPITAL ENCOUNTER (EMERGENCY)
Facility: HOSPITAL | Age: 31
Discharge: HOME | End: 2021-08-21
Attending: SURGERY
Payer: MEDICAID

## 2021-08-21 VITALS
SYSTOLIC BLOOD PRESSURE: 138 MMHG | HEART RATE: 109 BPM | DIASTOLIC BLOOD PRESSURE: 88 MMHG | TEMPERATURE: 99 F | RESPIRATION RATE: 18 BRPM | OXYGEN SATURATION: 95 %

## 2021-08-21 DIAGNOSIS — Z20.822 CLOSE EXPOSURE TO COVID-19 VIRUS: Primary | ICD-10-CM

## 2021-08-21 PROCEDURE — U0003 INFECTIOUS AGENT DETECTION BY NUCLEIC ACID (DNA OR RNA); SEVERE ACUTE RESPIRATORY SYNDROME CORONAVIRUS 2 (SARS-COV-2) (CORONAVIRUS DISEASE [COVID-19]), AMPLIFIED PROBE TECHNIQUE, MAKING USE OF HIGH THROUGHPUT TECHNOLOGIES AS DESCRIBED BY CMS-2020-01-R: HCPCS | Performed by: SURGERY

## 2021-08-21 PROCEDURE — 99284 EMERGENCY DEPT VISIT MOD MDM: CPT

## 2021-08-21 PROCEDURE — U0005 INFEC AGEN DETEC AMPLI PROBE: HCPCS | Performed by: SURGERY

## 2021-08-21 RX ORDER — METHYLPREDNISOLONE 4 MG/1
TABLET ORAL
Qty: 1 PACKAGE | Refills: 0 | Status: SHIPPED | OUTPATIENT
Start: 2021-08-21 | End: 2021-11-03

## 2021-08-21 RX ORDER — CETIRIZINE HYDROCHLORIDE 10 MG/1
10 TABLET ORAL DAILY
Qty: 30 TABLET | Refills: 0 | Status: SHIPPED | OUTPATIENT
Start: 2021-08-21 | End: 2021-11-03

## 2021-08-21 RX ORDER — BENZONATATE 100 MG/1
200 CAPSULE ORAL 3 TIMES DAILY PRN
Qty: 20 CAPSULE | Refills: 0 | Status: SHIPPED | OUTPATIENT
Start: 2021-08-21 | End: 2021-08-31

## 2021-08-22 ENCOUNTER — TELEPHONE (OUTPATIENT)
Dept: EMERGENCY MEDICINE | Facility: HOSPITAL | Age: 31
End: 2021-08-22

## 2021-08-22 DIAGNOSIS — U07.1 COVID-19 VIRUS INFECTION: Primary | ICD-10-CM

## 2021-08-22 LAB
SARS-COV-2 RNA RESP QL NAA+PROBE: DETECTED
SARS-COV-2- CYCLE NUMBER: 17.06

## 2021-10-10 ENCOUNTER — OFFICE VISIT (OUTPATIENT)
Dept: URGENT CARE | Facility: CLINIC | Age: 31
End: 2021-10-10
Payer: MEDICAID

## 2021-10-10 VITALS
TEMPERATURE: 99 F | HEART RATE: 97 BPM | SYSTOLIC BLOOD PRESSURE: 152 MMHG | OXYGEN SATURATION: 98 % | DIASTOLIC BLOOD PRESSURE: 103 MMHG

## 2021-10-10 DIAGNOSIS — K02.9 DENTAL DECAY: Primary | ICD-10-CM

## 2021-10-10 DIAGNOSIS — K08.89 PAIN, DENTAL: ICD-10-CM

## 2021-10-10 PROCEDURE — 99214 OFFICE O/P EST MOD 30 MIN: CPT | Mod: S$GLB,,, | Performed by: PHYSICIAN ASSISTANT

## 2021-10-10 PROCEDURE — 99214 PR OFFICE/OUTPT VISIT, EST, LEVL IV, 30-39 MIN: ICD-10-PCS | Mod: S$GLB,,, | Performed by: PHYSICIAN ASSISTANT

## 2021-10-10 RX ORDER — TRAMADOL HYDROCHLORIDE 50 MG/1
50 TABLET ORAL EVERY 6 HOURS PRN
Qty: 12 TABLET | Refills: 0 | Status: SHIPPED | OUTPATIENT
Start: 2021-10-10 | End: 2021-10-13

## 2021-10-10 RX ORDER — AMOXICILLIN 875 MG/1
875 TABLET, FILM COATED ORAL EVERY 12 HOURS
Qty: 20 TABLET | Refills: 0 | Status: SHIPPED | OUTPATIENT
Start: 2021-10-10 | End: 2021-10-20

## 2021-10-10 RX ORDER — DICLOFENAC SODIUM 75 MG/1
75 TABLET, DELAYED RELEASE ORAL 2 TIMES DAILY
Qty: 20 TABLET | Refills: 0 | Status: SHIPPED | OUTPATIENT
Start: 2021-10-10 | End: 2021-10-20

## 2021-10-25 ENCOUNTER — OFFICE VISIT (OUTPATIENT)
Dept: URGENT CARE | Facility: CLINIC | Age: 31
End: 2021-10-25
Payer: MEDICAID

## 2021-10-25 VITALS
SYSTOLIC BLOOD PRESSURE: 143 MMHG | TEMPERATURE: 99 F | HEIGHT: 65 IN | DIASTOLIC BLOOD PRESSURE: 100 MMHG | OXYGEN SATURATION: 98 % | BODY MASS INDEX: 42.65 KG/M2 | RESPIRATION RATE: 15 BRPM | HEART RATE: 88 BPM | WEIGHT: 256 LBS

## 2021-10-25 DIAGNOSIS — K04.7 DENTAL ABSCESS: Primary | ICD-10-CM

## 2021-10-25 DIAGNOSIS — F17.200 SMOKER: ICD-10-CM

## 2021-10-25 PROCEDURE — 99214 PR OFFICE/OUTPT VISIT, EST, LEVL IV, 30-39 MIN: ICD-10-PCS | Mod: S$GLB,,, | Performed by: FAMILY MEDICINE

## 2021-10-25 PROCEDURE — 99214 OFFICE O/P EST MOD 30 MIN: CPT | Mod: S$GLB,,, | Performed by: FAMILY MEDICINE

## 2021-10-25 RX ORDER — NAPROXEN 500 MG/1
500 TABLET ORAL 2 TIMES DAILY WITH MEALS
Qty: 20 TABLET | Refills: 0 | Status: SHIPPED | OUTPATIENT
Start: 2021-10-25 | End: 2021-11-03

## 2021-10-25 RX ORDER — AMOXICILLIN AND CLAVULANATE POTASSIUM 875; 125 MG/1; MG/1
1 TABLET, FILM COATED ORAL 2 TIMES DAILY
Qty: 20 TABLET | Refills: 0 | Status: SHIPPED | OUTPATIENT
Start: 2021-10-25 | End: 2021-11-04

## 2021-10-25 RX ORDER — TRAMADOL HYDROCHLORIDE 50 MG/1
50 TABLET ORAL EVERY 6 HOURS PRN
Qty: 10 TABLET | Refills: 0 | Status: SHIPPED | OUTPATIENT
Start: 2021-10-25 | End: 2021-11-03

## 2021-12-30 ENCOUNTER — HOSPITAL ENCOUNTER (EMERGENCY)
Facility: HOSPITAL | Age: 31
Discharge: HOME | End: 2021-12-30
Attending: STUDENT IN AN ORGANIZED HEALTH CARE EDUCATION/TRAINING PROGRAM
Payer: MEDICAID

## 2021-12-30 VITALS
RESPIRATION RATE: 20 BRPM | BODY MASS INDEX: 42.48 KG/M2 | WEIGHT: 255.31 LBS | TEMPERATURE: 97 F | OXYGEN SATURATION: 99 % | DIASTOLIC BLOOD PRESSURE: 83 MMHG | HEART RATE: 111 BPM | SYSTOLIC BLOOD PRESSURE: 130 MMHG

## 2021-12-30 DIAGNOSIS — U07.1 COVID-19: Primary | ICD-10-CM

## 2021-12-30 DIAGNOSIS — Z20.822 CLOSE EXPOSURE TO COVID-19 VIRUS: ICD-10-CM

## 2021-12-30 LAB — SARS-COV-2 RDRP RESP QL NAA+PROBE: POSITIVE

## 2021-12-30 PROCEDURE — U0002 COVID-19 LAB TEST NON-CDC: HCPCS | Performed by: STUDENT IN AN ORGANIZED HEALTH CARE EDUCATION/TRAINING PROGRAM

## 2021-12-30 PROCEDURE — 99283 EMERGENCY DEPT VISIT LOW MDM: CPT

## 2021-12-30 RX ORDER — PROMETHAZINE HYDROCHLORIDE AND DEXTROMETHORPHAN HYDROBROMIDE 6.25; 15 MG/5ML; MG/5ML
5 SYRUP ORAL EVERY 6 HOURS PRN
Qty: 100 ML | Refills: 0 | Status: SHIPPED | OUTPATIENT
Start: 2021-12-30 | End: 2023-04-19

## 2021-12-30 NOTE — ED PROVIDER NOTES
Encounter Date: 12/30/2021       History     Chief Complaint   Patient presents with    COVID-19 Concerns     Pt reports recent exposure to COVID  States she has been having nasal congestion and sneezing since yesterday - requesting test       April Taylor Nettles is a 31 y.o. female with PMH of depression, hypertension, scalp mass, UTI who presents to the ED for COVID-19 testing.  Patient presents with nasal congestion and frequent sneezing since yesterday.  She reports exposure to COVID-19 and is requesting testing.  She denies loss of taste or smell.  Denies cough, chest pain, shortness of breath.  Denies nausea, vomiting, or diarrhea.  She reports + personal history of COVID-19 infection.  She is not vaccinated.    The history is provided by the patient.     Review of patient's allergies indicates:  No Known Allergies  Past Medical History:   Diagnosis Date    Back pain     Depression     Hypertension     during pregnancy    Scalp mass     UTI (urinary tract infection)     hx of      Past Surgical History:   Procedure Laterality Date    CERVICAL BIOPSY  2017    LAPAROSCOPIC SALPINGECTOMY Bilateral 7/25/2019    Procedure: SALPINGECTOMY, LAPAROSCOPIC;  Surgeon: Collette Larkin MD;  Location: [facility];  Service: OB/GYN;  Laterality: Bilateral;     Family History   Problem Relation Age of Onset    Diabetes Mother     Hypertension Mother     Thyroid disease Father     Ovarian cancer Maternal Aunt      Social History     Tobacco Use    Smoking status: Current Some Day Smoker     Packs/day: 0.25     Types: Cigarettes     Start date: 12/22/2015    Smokeless tobacco: Never Used   Substance Use Topics    Alcohol use: Yes     Comment: social    Drug use: No     Review of Systems   Constitutional: Negative for activity change, chills and fever.   HENT: Positive for congestion and sneezing. Negative for ear discharge, ear pain, postnasal drip, sinus pressure, sinus pain and sore throat.    Respiratory:  Negative for cough, chest tightness and shortness of breath.    Cardiovascular: Negative for chest pain.   Gastrointestinal: Negative for abdominal distention, abdominal pain and nausea.   Genitourinary: Negative for dysuria, frequency and urgency.   Musculoskeletal: Negative for back pain.   Skin: Negative for rash.   Neurological: Negative for dizziness, weakness, light-headedness and numbness.   Hematological: Does not bruise/bleed easily.       Physical Exam     Initial Vitals [12/30/21 1001]   BP Pulse Resp Temp SpO2   130/83 (!) 111 20 96.5 °F (35.8 °C) 99 %      MAP       --         Physical Exam    Nursing note and vitals reviewed.  Constitutional: She appears well-developed and well-nourished.   HENT:   Head: Normocephalic and atraumatic.   Right Ear: Tympanic membrane, external ear and ear canal normal. Tympanic membrane is not erythematous. No middle ear effusion.   Left Ear: Tympanic membrane, external ear and ear canal normal. Tympanic membrane is not erythematous.  No middle ear effusion.   Nose: Rhinorrhea present.   Mouth/Throat: Uvula is midline, oropharynx is clear and moist and mucous membranes are normal. Mucous membranes are not pale and not dry.   Eyes: Conjunctivae and EOM are normal. Pupils are equal, round, and reactive to light.   Neck: Neck supple.   Normal range of motion.  Cardiovascular: Normal rate, regular rhythm, normal heart sounds and intact distal pulses.   Pulmonary/Chest: Effort normal and breath sounds normal. She has no decreased breath sounds. She has no wheezes. She has no rhonchi. She has no rales.   Abdominal: Abdomen is soft. Bowel sounds are normal. There is no abdominal tenderness.   Musculoskeletal:         General: Normal range of motion.      Cervical back: Normal range of motion and neck supple.     Neurological: She is alert and oriented to person, place, and time. She has normal strength. She displays normal reflexes. No cranial nerve deficit or sensory deficit.    Skin: Skin is warm and dry. Capillary refill takes less than 2 seconds. No rash noted.   Psychiatric: She has a normal mood and affect. Her behavior is normal. Judgment and thought content normal.         ED Course   Procedures  Labs Reviewed   SARS-COV-2 RNA AMPLIFICATION, QUAL - Abnormal; Notable for the following components:       Result Value    SARS-CoV-2 RNA, Amplification, Qual Positive (*)     All other components within normal limits          Imaging Results    None          Medications - No data to display  Medical Decision Making:   Differential Diagnosis:   COVID-19, viral URI, allergic rhinitis, sinusitis  Clinical Tests:   Lab Tests: Ordered and Reviewed  ED Management:  Evaluation with 31-year-old female who presents requesting testing for COVID-19 after exposure.  She presents with stable vital signs.  She has clear nasal discharge on exam.  Covid positive.  Patient informed of results.  Instructed to self quarantine per CDC guidelines.  Referral to infusion center placed.  Patient will be discharged home with symptomatic treatment for COVID-19. Patient/caregiver voices understanding and feels comfortable with discharge plan.      The patient acknowledges that close follow up with medical provider is required. Instructed to follow up with PCP within 2 days. Patient was given specific return precautions. The patient agrees to comply with all instruction and directions given in the ER.                       Clinical Impression:   Final diagnoses:  [Z20.822] Close exposure to COVID-19 virus  [U07.1] COVID-19 (Primary)          ED Disposition Condition    Discharge Stable        ED Prescriptions     Medication Sig Dispense Start Date End Date Auth. Provider    promethazine-dextromethorphan (PROMETHAZINE-DM) 6.25-15 mg/5 mL Syrp Take 5 mLs by mouth every 6 (six) hours as needed (cough). 100 mL 12/30/2021  Alina Linda NP        Follow-up Information     Follow up With Specialties Details Why Contact  Info    Kaitlyn Fan NP Family Medicine Go in 2 days  [address]  [facility] [address]  [phone]             Alina Linda NP  12/30/21 1041

## 2021-12-30 NOTE — ED TRIAGE NOTES
31 y.o. female presents to ER  Chief Complaint   Patient presents with    COVID-19 Concerns     Pt reports recent exposure to COVID  States she has been having nasal congestion and sneezing since yesterday - requesting test     . No acute distress noted.

## 2021-12-30 NOTE — Clinical Note
"April "April" Yoon was seen and treated in our emergency department on 12/30/2021.     COVID-19 is present in our communities across the state. There is limited testing for COVID at this time, so not all patients can be tested. In this situation, your employee meets the following criteria:    Gloria Nettles has met the criteria for COVID-19 testing and has a POSITIVE result. She can return to work once they are asymptomatic for 72 hours without the use of fever reducing medications AND at least ten days from the first positive result.     If you have any questions or concerns, or if I can be of further assistance, please do not hesitate to contact me.    Sincerely,             Alina Linda NP"

## 2021-12-30 NOTE — Clinical Note
"April "April" Yoon was seen and treated in our emergency department on 12/30/2021.  She may return to work on 01/04/2022.       If you have any questions or concerns, please don't hesitate to call.      Alina Linda NP"

## 2022-04-04 ENCOUNTER — PATIENT MESSAGE (OUTPATIENT)
Dept: ADMINISTRATIVE | Facility: HOSPITAL | Age: 32
End: 2022-04-04
Payer: MEDICAID

## 2022-06-12 ENCOUNTER — OFFICE VISIT (OUTPATIENT)
Dept: URGENT CARE | Facility: CLINIC | Age: 32
End: 2022-06-12
Payer: MEDICAID

## 2022-06-12 VITALS
TEMPERATURE: 98 F | BODY MASS INDEX: 40.44 KG/M2 | WEIGHT: 243 LBS | OXYGEN SATURATION: 97 % | RESPIRATION RATE: 18 BRPM | SYSTOLIC BLOOD PRESSURE: 129 MMHG | DIASTOLIC BLOOD PRESSURE: 87 MMHG | HEART RATE: 94 BPM

## 2022-06-12 DIAGNOSIS — K08.89 PAIN, DENTAL: Primary | ICD-10-CM

## 2022-06-12 PROCEDURE — 3074F SYST BP LT 130 MM HG: CPT | Mod: CPTII,S$GLB,, | Performed by: NURSE PRACTITIONER

## 2022-06-12 PROCEDURE — 4010F PR ACE/ARB THEARPY RXD/TAKEN: ICD-10-PCS | Mod: CPTII,S$GLB,, | Performed by: NURSE PRACTITIONER

## 2022-06-12 PROCEDURE — 4010F ACE/ARB THERAPY RXD/TAKEN: CPT | Mod: CPTII,S$GLB,, | Performed by: NURSE PRACTITIONER

## 2022-06-12 PROCEDURE — 99214 PR OFFICE/OUTPT VISIT, EST, LEVL IV, 30-39 MIN: ICD-10-PCS | Mod: S$GLB,,, | Performed by: NURSE PRACTITIONER

## 2022-06-12 PROCEDURE — 1159F MED LIST DOCD IN RCRD: CPT | Mod: CPTII,S$GLB,, | Performed by: NURSE PRACTITIONER

## 2022-06-12 PROCEDURE — 3008F BODY MASS INDEX DOCD: CPT | Mod: CPTII,S$GLB,, | Performed by: NURSE PRACTITIONER

## 2022-06-12 PROCEDURE — 3074F PR MOST RECENT SYSTOLIC BLOOD PRESSURE < 130 MM HG: ICD-10-PCS | Mod: CPTII,S$GLB,, | Performed by: NURSE PRACTITIONER

## 2022-06-12 PROCEDURE — 3008F PR BODY MASS INDEX (BMI) DOCUMENTED: ICD-10-PCS | Mod: CPTII,S$GLB,, | Performed by: NURSE PRACTITIONER

## 2022-06-12 PROCEDURE — 1159F PR MEDICATION LIST DOCUMENTED IN MEDICAL RECORD: ICD-10-PCS | Mod: CPTII,S$GLB,, | Performed by: NURSE PRACTITIONER

## 2022-06-12 PROCEDURE — 3079F PR MOST RECENT DIASTOLIC BLOOD PRESSURE 80-89 MM HG: ICD-10-PCS | Mod: CPTII,S$GLB,, | Performed by: NURSE PRACTITIONER

## 2022-06-12 PROCEDURE — 99214 OFFICE O/P EST MOD 30 MIN: CPT | Mod: S$GLB,,, | Performed by: NURSE PRACTITIONER

## 2022-06-12 PROCEDURE — 3079F DIAST BP 80-89 MM HG: CPT | Mod: CPTII,S$GLB,, | Performed by: NURSE PRACTITIONER

## 2022-06-12 RX ORDER — NAPROXEN 500 MG/1
500 TABLET ORAL 2 TIMES DAILY WITH MEALS
Qty: 12 TABLET | Refills: 0 | Status: SHIPPED | OUTPATIENT
Start: 2022-06-12 | End: 2022-06-15

## 2022-06-12 RX ORDER — CLINDAMYCIN HYDROCHLORIDE 300 MG/1
300 CAPSULE ORAL EVERY 6 HOURS
Qty: 28 CAPSULE | Refills: 0 | Status: SHIPPED | OUTPATIENT
Start: 2022-06-12 | End: 2022-06-19

## 2022-06-12 NOTE — PATIENT INSTRUCTIONS
Take medications as directed  Called into emergency contact if you developed facial pain swelling  Go to emergency department for difficulty swallowing    Dentist - Emergency [place]     Dr. Alverto Frey  DDS  [address], [place]  [id number] [phone]     Dentist - Emergency     Dr. Mart DDS  [address].  [place], [place]    [phone]        Please follow up with your primary care doctor or specialist as needed.  Kaitlyn Fan NP  [phone]    Dentist - Emergency [place]     Dr. Alverto Frey  DDS  [address], [place]  [id number] [phone]     Dentist - Emergency     Dr. Mart DDS  [address] [facility] [facility]  [facility]  [place], [place]    [phone]        Please follow up with your primary care doctor or specialist as needed.  Kaitlyn Fan NP  [phone]  Dental Pain ED   General Information   You came to the Emergency Department (ED) for dental pain. This happens when the nerve in a tooth or the gum tissue around a tooth are irritated. A fractured tooth, tooth decay, or gum disease is most often the cause of tooth pain. Tooth problems can cause pain in other areas of the head and neck. At times, tooth problems may also cause ear and jaw pain. Pain from a sinus infection may also feel like tooth pain.  What care is needed at home?   Call your regular doctor to let them know you were in the ED. Make a follow-up appointment with a dentist if you were told to.  Avoid very cold or very hot food and drinks. These can make pain worse.  If you smoke, try to quit. Your doctor or nurse can help.  You may want to take medicine like ibuprofen, naproxen, or acetaminophen to help with pain.  Brush your teeth at least 2 times a day. Use toothpaste with fluoride.  Use dental floss to clean between your teeth every day.  The doctor may have ordered antibiotics to treat an infection. It is important to take all your antibiotics even if you start to feel better.  When do I need to get emergency help?    Return to the ED if:   You have trouble breathing.  You have swelling of the tongue, neck, or face.  You have a fever of 100.4°F (38°C) or higher or chills.  You are not able to open your mouth or eat.  Your pain spreads into your jaw or neck or around your eyes.  When do I need to call the doctor?   You have discharge around a tooth or a foul taste or smell in your mouth.  You have trouble swallowing or chewing.  You have lots of bleeding from the gums or they become very swollen.  You have very bad pain that is not helped by pain medicines.  You have new or worsening symptoms.  Last Reviewed Date   2020-08-19  Consumer Information Use and Disclaimer   This information is not specific medical advice and does not replace information you receive from your health care provider. This is only a brief summary of general information. It does NOT include all information about conditions, illnesses, injuries, tests, procedures, treatments, therapies, discharge instructions or life-style choices that may apply to you. You must talk with your health care provider for complete information about your health and treatment options. This information should not be used to decide whether or not to accept your health care providers advice, instructions or recommendations. Only your health care provider has the knowledge and training to provide advice that is right for you.  Copyright   Copyright © 2021 [employer] and its affiliates and/or licensors. All rights reserved.

## 2022-06-12 NOTE — PROGRESS NOTES
Subjective:       Patient ID: April Denmatilde Nettles is a 32 y.o. female.    Vitals:  weight is 110.2 kg (243 lb). Her tympanic temperature is 98.3 °F (36.8 °C). Her blood pressure is 129/87 and her pulse is 94. Her respiration is 18 and oxygen saturation is 97%.     Chief Complaint: Dental Pain    Dental Pain   This is a new problem. Episode onset: 2 days ago. The problem occurs constantly. The problem has been gradually worsening. The pain is at a severity of 8/10. The pain is severe. Associated symptoms include facial pain. Pertinent negatives include no fever, oral bleeding or sinus pressure. Treatments tried: BZ powder. The treatment provided no relief.       Constitution: Negative for activity change, appetite change and fever.   HENT: Positive for dental problem. Negative for facial swelling, sinus pain, sinus pressure, sore throat, trouble swallowing and voice change.    Neck: Negative for neck pain and neck stiffness.   Cardiovascular: Negative for chest pain and sob on exertion.   Respiratory: Negative for cough, COPD and stridor.    Gastrointestinal: Negative for abdominal trauma, abdominal pain, nausea and vomiting.   Musculoskeletal: Positive for pain.   Skin: Negative for rash.       Objective:      Physical Exam   Constitutional: normal  HENT:   Head: Normocephalic.   Ears:   Right Ear: Tympanic membrane normal.   Left Ear: Tympanic membrane normal.   Nose: Nose normal.   Mouth/Throat: Uvula is midline, oropharynx is clear and moist and mucous membranes are normal. Mucous membranes are moist. She does not have dentures. No oral lesions. Abnormal dentition. Dental caries present. No dental abscesses, uvula swelling or lacerations. No tonsillar exudate.       Eyes: Conjunctivae are normal. Pupils are equal, round, and reactive to light.   Cardiovascular: Normal rate.   Pulmonary/Chest: Effort normal and breath sounds normal. No stridor. No respiratory distress. She has no wheezes. She has no rhonchi.  She has no rales. She exhibits no tenderness.   Abdominal: Bowel sounds are normal. She exhibits no distension and no mass. There is no abdominal tenderness. There is no rebound, no guarding, no left CVA tenderness and no right CVA tenderness. No hernia.   Neurological: She is alert.   Skin: Skin is warm. Capillary refill takes less than 2 seconds.   Psychiatric: Mood normal.   Nursing note and vitals reviewed.        Assessment:       1. Pain, dental          Plan:         Pain, dental    Dentist - Emergency [place]     Dr. Alverto Frey  DDS  [address], [place]  [address]  [phone]     Dentist - Emergency     Dr. Mart DDS  [address] [facility].  [address]  [place], [place]    [phone]   Dental Pain ED   General Information   You came to the Emergency Department (ED) for dental pain. This happens when the nerve in a tooth or the gum tissue around a tooth are irritated. A fractured tooth, tooth decay, or gum disease is most often the cause of tooth pain. Tooth problems can cause pain in other areas of the head and neck. At times, tooth problems may also cause ear and jaw pain. Pain from a sinus infection may also feel like tooth pain.  What care is needed at home?   · Call your regular doctor to let them know you were in the ED. Make a follow-up appointment with a dentist if you were told to.  · Avoid very cold or very hot food and drinks. These can make pain worse.  · If you smoke, try to quit. Your doctor or nurse can help.  · You may want to take medicine like ibuprofen, naproxen, or acetaminophen to help with pain.  · Brush your teeth at least 2 times a day. Use toothpaste with fluoride.  · Use dental floss to clean between your teeth every day.  · The doctor may have ordered antibiotics to treat an infection. It is important to take all your antibiotics even if you start to feel better.  When do I need to get emergency help?   · Return to the ED if:   ? You have trouble breathing.  ? You have swelling of  the tongue, neck, or face.  ? You have a fever of 100.4°F (38°C) or higher or chills.  ? You are not able to open your mouth or eat.  ? Your pain spreads into your jaw or neck or around your eyes.  When do I need to call the doctor?   · You have discharge around a tooth or a foul taste or smell in your mouth.  · You have trouble swallowing or chewing.  · You have lots of bleeding from the gums or they become very swollen.  · You have very bad pain that is not helped by pain medicines.  · You have new or worsening symptoms.  Last Reviewed Date   2020-08-19  Consumer Information Use and Disclaimer   This information is not specific medical advice and does not replace information you receive from your health care provider. This is only a brief summary of general information. It does NOT include all information about conditions, illnesses, injuries, tests, procedures, treatments, therapies, discharge instructions or life-style choices that may apply to you. You must talk with your health care provider for complete information about your health and treatment options. This information should not be used to decide whether or not to accept your health care providers advice, instructions or recommendations. Only your health care provider has the knowledge and training to provide advice that is right for you.  Copyright   Copyright © 2021 [employer] and its affiliates and/or licensors. All rights reserved.         Additional MDM:     Heart Failure Score:   COPD = No

## 2022-07-11 ENCOUNTER — PATIENT MESSAGE (OUTPATIENT)
Dept: ADMINISTRATIVE | Facility: HOSPITAL | Age: 32
End: 2022-07-11
Payer: MEDICAID

## 2022-10-03 ENCOUNTER — PATIENT MESSAGE (OUTPATIENT)
Dept: ADMINISTRATIVE | Facility: HOSPITAL | Age: 32
End: 2022-10-03
Payer: MEDICAID

## 2023-01-09 ENCOUNTER — PATIENT MESSAGE (OUTPATIENT)
Dept: ADMINISTRATIVE | Facility: HOSPITAL | Age: 33
End: 2023-01-09
Payer: MEDICAID

## 2024-03-30 ENCOUNTER — OFFICE VISIT (OUTPATIENT)
Dept: URGENT CARE | Facility: CLINIC | Age: 34
End: 2024-03-30
Payer: MEDICAID

## 2024-03-30 VITALS
OXYGEN SATURATION: 100 % | DIASTOLIC BLOOD PRESSURE: 100 MMHG | HEIGHT: 65 IN | BODY MASS INDEX: 38.32 KG/M2 | TEMPERATURE: 98 F | SYSTOLIC BLOOD PRESSURE: 157 MMHG | WEIGHT: 230 LBS | RESPIRATION RATE: 20 BRPM | HEART RATE: 93 BPM

## 2024-03-30 DIAGNOSIS — K04.7 DENTAL INFECTION: Primary | ICD-10-CM

## 2024-03-30 DIAGNOSIS — K03.81 CRACKED TOOTH: ICD-10-CM

## 2024-03-30 PROCEDURE — 99213 OFFICE O/P EST LOW 20 MIN: CPT | Mod: S$GLB,,,

## 2024-03-30 RX ORDER — AMOXICILLIN AND CLAVULANATE POTASSIUM 875; 125 MG/1; MG/1
1 TABLET, FILM COATED ORAL EVERY 12 HOURS
Qty: 14 TABLET | Refills: 0 | Status: SHIPPED | OUTPATIENT
Start: 2024-03-30 | End: 2024-04-06

## 2024-03-30 RX ORDER — NAPROXEN 500 MG/1
500 TABLET ORAL 2 TIMES DAILY WITH MEALS
Qty: 14 TABLET | Refills: 0 | Status: SHIPPED | OUTPATIENT
Start: 2024-03-30 | End: 2024-04-06

## 2024-03-30 NOTE — PATIENT INSTRUCTIONS
You must understand that you have received treatment at an Urgent Care facility only, and that you may be  released before all of your medical problems are known or treated. Urgent Care facilities are not equipped to  handle life threatening emergencies. It is recommended that you seek care at an Emergency Department for  further evaluation of worsening or concerning symptoms, or possibly life threatening conditions as  discussed.    Follow up with [facility] at [phone].     Can use heat to area for pain. Avoid hard foods, candy, hot/cool liquids.     Patient Instructions   1.  Take all medications as directed. If you have been prescribed antibiotics, make sure to complete them.   2.  Rest and keep yourself/patient well hydrated. For adults, it is recommended to drink at least 8-10 glasses of water daily.   3.  For patients above 6 months of age who are not allergic to and are not on anticoagulants, you can alternate Tylenol and Motrin every 4-6 hours for fever above 100.4F and/or pain.  For patients less than 6 months of age, allergic to or intolerant to NSAIDS, have gastritis, gastric ulcers, or history of GI bleeds, are pregnant, or are on anticoagulant therapy, you can take Tylenol every 4 hours as needed for fever above 100.4F and/or pain.   4. You should schedule a follow-up appointment with your Primary Care Provider/Pediatrician for recheck in 2-3 days or as directed at this visit.   5.  If your condition fails to improve in a timely manner, you should receive another evaluation by your Primary Care Provider/Pediatrician to discuss your concerns or return to urgent care for a recheck.  If your condition worsens at any time, you should report immediately to your nearest Emergency Department for further evaluation. **You must understand that you have received Urgent Care treatment only and that you may be released before all of your medical problems are known or treated. You, the patient,  are responsible to arrange for follow-up care as instructed.

## 2024-03-30 NOTE — PROGRESS NOTES
"Subjective:      Patient ID: April Denmatilde Nettles is a 33 y.o. female.    Vitals:  height is 5' 5" (1.651 m) and weight is 104.3 kg (230 lb). Her tympanic temperature is 98 °F (36.7 °C). Her blood pressure is 157/100 (abnormal) and her pulse is 93. Her respiration is 20 and oxygen saturation is 100%.     Chief Complaint: Dental Pain    Pt reports dental filling at left lower tooth cracked while eating something yesterday. Pt reports pain and sensitivity to area. Denies fever, trouble swallowing.     Dental Pain   This is a new problem. The current episode started yesterday. The problem occurs constantly. The pain is at a severity of 10/10. The pain is severe. Associated symptoms include facial pain. Pertinent negatives include no difficulty swallowing, fever, oral bleeding, sinus pressure or thermal sensitivity. Treatments tried: oragel. The treatment provided no relief.       Constitution: Negative for fever.   HENT:  Negative for sinus pressure.       Objective:     Physical Exam   Constitutional:  Non-toxic appearance. She does not appear ill. No distress.   HENT:   Head: Normocephalic and atraumatic.   Ears:   Right Ear: Tympanic membrane, external ear and ear canal normal.   Left Ear: Tympanic membrane, external ear and ear canal normal.   Mouth/Throat: Uvula is midline and oropharynx is clear and moist. Mucous membranes are moist. Abnormal dentition. Dental caries present. No posterior oropharyngeal edema or posterior oropharyngeal erythema. Oropharynx is clear.       Multiple dental fillings in oral cavity. Left lower molar with filling that appears cracked down to gingiva. Dental caries present. No gingival swelling fluctuance. No facial swelling or TTP.       Comments: Multiple dental fillings in oral cavity. Left lower molar with filling that appears cracked down to gingiva. Dental caries present. No gingival swelling fluctuance. No facial swelling or TTP.   Eyes: Conjunctivae are normal.   Neck: Neck " supple. No neck rigidity present.   Cardiovascular: Normal rate.   Pulmonary/Chest: Effort normal. No respiratory distress.   Abdominal: Normal appearance.   Neurological: no focal deficit. She is alert.   Skin: Skin is warm, dry and not diaphoretic.   Psychiatric: Her behavior is normal. Judgment and thought content normal.   Nursing note and vitals reviewed.      Assessment:     1. Dental infection    2. Cracked tooth        Plan:       Dental infection  -     amoxicillin-clavulanate 875-125mg (AUGMENTIN) 875-125 mg per tablet; Take 1 tablet by mouth every 12 (twelve) hours. for 7 days  Dispense: 14 tablet; Refill: 0  -     naproxen (NAPROSYN) 500 MG tablet; Take 1 tablet (500 mg total) by mouth 2 (two) times daily with meals. for 7 days  Dispense: 14 tablet; Refill: 0    Cracked tooth      Follow up with [facility] at [phone].     Can use heat to area for pain. Avoid hard foods, candy, hot/cool liquids.     Patient Instructions   1.  Take all medications as directed. If you have been prescribed antibiotics, make sure to complete them.   2.  Rest and keep yourself/patient well hydrated. For adults, it is recommended to drink at least 8-10 glasses of water daily.   3.  For patients above 6 months of age who are not allergic to and are not on anticoagulants, you can alternate Tylenol and Motrin every 4-6 hours for fever above 100.4F and/or pain.  For patients less than 6 months of age, allergic to or intolerant to NSAIDS, have gastritis, gastric ulcers, or history of GI bleeds, are pregnant, or are on anticoagulant therapy, you can take Tylenol every 4 hours as needed for fever above 100.4F and/or pain.   4. You should schedule a follow-up appointment with your Primary Care Provider/Pediatrician for recheck in 2-3 days or as directed at this visit.   5.  If your condition fails to improve in a timely manner, you should receive another evaluation by your Primary Care Provider/Pediatrician to  discuss your concerns or return to urgent care for a recheck.  If your condition worsens at any time, you should report immediately to your nearest Emergency Department for further evaluation. **You must understand that you have received Urgent Care treatment only and that you may be released before all of your medical problems are known or treated. You, the patient, are responsible to arrange for follow-up care as instructed.

## 2024-05-06 ENCOUNTER — OFFICE VISIT (OUTPATIENT)
Dept: URGENT CARE | Facility: CLINIC | Age: 34
End: 2024-05-06
Payer: MEDICAID

## 2024-05-06 VITALS
HEART RATE: 98 BPM | DIASTOLIC BLOOD PRESSURE: 84 MMHG | TEMPERATURE: 99 F | BODY MASS INDEX: 38.27 KG/M2 | SYSTOLIC BLOOD PRESSURE: 127 MMHG | RESPIRATION RATE: 17 BRPM | HEIGHT: 65 IN | OXYGEN SATURATION: 98 %

## 2024-05-06 DIAGNOSIS — R30.9 PAIN WITH URINATION: ICD-10-CM

## 2024-05-06 DIAGNOSIS — N30.01 ACUTE CYSTITIS WITH HEMATURIA: Primary | ICD-10-CM

## 2024-05-06 LAB
BILIRUB UR QL STRIP: NEGATIVE
GLUCOSE UR QL STRIP: NEGATIVE
KETONES UR QL STRIP: NEGATIVE
LEUKOCYTE ESTERASE UR QL STRIP: POSITIVE
PH, POC UA: 7.5 (ref 5–8)
POC BLOOD, URINE: POSITIVE
POC NITRATES, URINE: NEGATIVE
PROT UR QL STRIP: POSITIVE
SP GR UR STRIP: 1.02 (ref 1–1.03)
UROBILINOGEN UR STRIP-ACNC: 1 (ref 0.1–1.1)

## 2024-05-06 PROCEDURE — 99213 OFFICE O/P EST LOW 20 MIN: CPT | Mod: S$GLB,,, | Performed by: NURSE PRACTITIONER

## 2024-05-06 PROCEDURE — 81003 URINALYSIS AUTO W/O SCOPE: CPT | Mod: QW,S$GLB,, | Performed by: NURSE PRACTITIONER

## 2024-05-06 RX ORDER — NITROFURANTOIN 25; 75 MG/1; MG/1
100 CAPSULE ORAL 2 TIMES DAILY
Qty: 10 CAPSULE | Refills: 0 | Status: SHIPPED | OUTPATIENT
Start: 2024-05-06 | End: 2024-05-11

## 2024-05-06 RX ORDER — PHENAZOPYRIDINE HYDROCHLORIDE 200 MG/1
200 TABLET, FILM COATED ORAL 3 TIMES DAILY PRN
Qty: 6 TABLET | Refills: 0 | Status: SHIPPED | OUTPATIENT
Start: 2024-05-06

## 2024-05-06 NOTE — PATIENT INSTRUCTIONS
Urinary Tract Infection, Adult   General Information   You came to the Urgent Care for a urinary tract infection or UTI. Most UTIs are infections in either your bladder or your kidneys. Bladder infections are more common and may also be called cystitis. Kidney infections are more serious and may also be called pyelonephritis. You need antibiotics to treat a UTI. It is important to take all of your antibiotics even if you start to feel better.  What care is needed at home?   Call your regular doctor to let them know you were here at Urgent [facility]. Make a follow-up appointment if you were told to.  For the first day or so, you may want to take an over-the-counter medicine, like phenazopyridine. This will help to numb your bladder. You will also not have the strong urge to urinate. This medicine causes your urine and tears to look orange. If you have kidney disease, talk to your doctor before taking this medicine.  To lower your chance of getting a UTI in the future, you can:  Drink extra fluids.  If you have sex, urinate right afterwards.  When do I need to get emergency help?   Go to the ED if:   You have very bad pain in your back, shoulder, or belly.  You have a fever of 102.2°F (39°C); shaking chills or sweats even though you are taking antibiotics.  When do I need to call the doctor?   You have a fever up to 100.4°F (38°C).  You notice more blood in your urine.  Your signs get worse or do not improve within 24 hours of starting treatment.  You are not able to urinate for more than 8 hours  Your signs come back after treatment has stopped.  You have new or worsening symptoms.  Last Reviewed Date   2021-03-12  Consumer Information Use and Disclaimer   This information is not specific medical advice and does not replace information you receive from your health care provider. This is only a brief summary of general information. It does NOT include all information about conditions, illnesses, injuries, tests,  "procedures, treatments, therapies, discharge instructions or life-style choices that may apply to you. You must talk with your health care provider for complete information about your health and treatment options. This information should not be used to decide whether or not to accept your health care providers advice, instructions or recommendations. Only your health care provider has the knowledge and training to provide advice that is right for you.  Copyright   Copyright © 2021 [employer] and its affiliates and/or licensors. All rights reserved.         *Urinary Tract Infections*  1) Avoid tub baths.  2) Always urinate after intercourse(Teens/Adults).  3) Avoid "Fizzy" drinks/soda drinks.  4) Always wipe from front to back.  5) Wear only cotton underwear.  6) Drink a lot of fluids (at least 8-10 glasses of water) for 5 to 7 days to help flush your kidneys. You can also drink 1 shot-sized glass of cranberry juice 3X daily over the next several days to help cleanse your bladder, but studies show that cranberry juice does not cure or prevent a UTI.   7) Take all medications as directed. Make sure to complete all antibiotics as prescribed.    8) For patients above 6 months of age who are not allergic to and are not on anticoagulants, you can alternate Tylenol and Motrin every 4-6 hours for fever above 100.4F and/or pain.  For patients less than 6 months of age, allergic to or intolerant to NSAIDS, have gastritis, gastric ulcers, or history of GI bleeds, are pregnant, or are on anticoagulant therapy, you can take Tylenol every 4 hours as needed for fever above 100.4F and/or pain.   9) You should schedule a follow-up appointment with your Primary Care Provider/Pediatrician for recheck in 2-3 days or as directed at this visit.   10) If your condition fails to improve in a timely manner, you should receive another evaluation by your Primary Care Provider/Pediatrician to discuss your concerns or return to urgent care " for a recheck.  If your condition worsens at any time, you should report immediately to your nearest Emergency Department for further evaluation. **You must understand that you have received Urgent Care treatment only and that you may be released before all of your medical problems are known or treated. You, the patient, are responsible to arrange for follow-up care as instructed.

## 2024-10-10 ENCOUNTER — PATIENT MESSAGE (OUTPATIENT)
Dept: ADMINISTRATIVE | Facility: HOSPITAL | Age: 34
End: 2024-10-10
Payer: MEDICAID

## 2024-11-21 ENCOUNTER — PATIENT OUTREACH (OUTPATIENT)
Dept: ADMINISTRATIVE | Facility: HOSPITAL | Age: 34
End: 2024-11-21
Payer: MEDICAID

## 2024-11-21 NOTE — PROGRESS NOTES
Attempted to contact pt in reference to the HTN-gap report and overdue cervical screening. Unable to contact. Message left

## 2024-11-26 ENCOUNTER — PATIENT MESSAGE (OUTPATIENT)
Dept: ADMINISTRATIVE | Facility: HOSPITAL | Age: 34
End: 2024-11-26
Payer: MEDICAID

## 2024-11-27 ENCOUNTER — OFFICE VISIT (OUTPATIENT)
Dept: URGENT CARE | Facility: CLINIC | Age: 34
End: 2024-11-27
Payer: MEDICAID

## 2024-11-27 VITALS
RESPIRATION RATE: 18 BRPM | SYSTOLIC BLOOD PRESSURE: 105 MMHG | HEART RATE: 112 BPM | WEIGHT: 204.81 LBS | OXYGEN SATURATION: 99 % | TEMPERATURE: 99 F | BODY MASS INDEX: 34.12 KG/M2 | HEIGHT: 65 IN | DIASTOLIC BLOOD PRESSURE: 76 MMHG

## 2024-11-27 DIAGNOSIS — R11.10 VOMITING, UNSPECIFIED VOMITING TYPE, UNSPECIFIED WHETHER NAUSEA PRESENT: Primary | ICD-10-CM

## 2024-11-27 LAB
B-HCG UR QL: NEGATIVE
BILIRUBIN, UA POC OHS: NEGATIVE
BLOOD, UA POC OHS: NEGATIVE
CLARITY, UA POC OHS: ABNORMAL
COLOR, UA POC OHS: YELLOW
CTP QC/QA: YES
GLUCOSE, UA POC OHS: NEGATIVE
KETONES, UA POC OHS: NEGATIVE
LEUKOCYTES, UA POC OHS: NEGATIVE
NITRITE, UA POC OHS: NEGATIVE
PH, UA POC OHS: 6
PROTEIN, UA POC OHS: 100
SPECIFIC GRAVITY, UA POC OHS: 1.02
UROBILINOGEN, UA POC OHS: 0.2

## 2024-11-27 PROCEDURE — 99213 OFFICE O/P EST LOW 20 MIN: CPT | Mod: S$GLB,,,

## 2024-11-27 PROCEDURE — 81025 URINE PREGNANCY TEST: CPT | Mod: S$GLB,,,

## 2024-11-27 PROCEDURE — 81003 URINALYSIS AUTO W/O SCOPE: CPT | Mod: QW,S$GLB,,

## 2024-11-27 RX ORDER — PANTOPRAZOLE SODIUM 20 MG/1
20 TABLET, DELAYED RELEASE ORAL DAILY
Qty: 30 TABLET | Refills: 0 | Status: SHIPPED | OUTPATIENT
Start: 2024-11-27 | End: 2025-11-27

## 2024-11-27 RX ORDER — PROMETHAZINE HYDROCHLORIDE 12.5 MG/1
12.5 TABLET ORAL EVERY 6 HOURS PRN
Qty: 6 TABLET | Refills: 0 | Status: SHIPPED | OUTPATIENT
Start: 2024-11-27

## 2024-11-27 NOTE — PROGRESS NOTES
"Subjective:      Patient ID: April Denmatilde Nettles is a 34 y.o. female.    Vitals:  height is 5' 5" (1.651 m) and weight is 92.9 kg (204 lb 12.9 oz). Her oral temperature is 98.5 °F (36.9 °C). Her blood pressure is 105/76 and her pulse is 112 (abnormal). Her respiration is 18 and oxygen saturation is 99%.     Chief Complaint: Emesis    Patient symptoms started 2-3 days ago. She's been nauseous, vomiting and having a headache. Pt 3-4 episodes of vomiting daily. Stated yellow in color and food in episode last night. Stated she tried to eat last night but felt nauseous. Last bowel movement today. Denies fever, hematochezia, hematemesis, melena, diarrhea, dysuria, new medications. LMP was oct 14th.     Emesis   This is a new problem. Episode onset: 3 days. The problem occurs 2 to 4 times per day. The problem has been unchanged. The emesis has an appearance of bile. There has been no fever. Associated symptoms include sweats. Pertinent negatives include no headaches. Treatments tried: zofran. The treatment provided no relief.       Gastrointestinal:  Positive for vomiting.   Neurological:  Negative for headaches.      Objective:     Physical Exam   Constitutional: She is oriented to person, place, and time. She appears well-developed.  Non-toxic appearance. She does not appear ill. No distress.      Comments:Pt is pleasant and NAD. Seated upright on exam table and able to lie on exam table with ease.      HENT:   Head: Normocephalic and atraumatic.   Ears:   Right Ear: External ear normal.   Left Ear: External ear normal.   Nose: Nose normal.   Mouth/Throat: Mucous membranes are normal.   Eyes: Conjunctivae and lids are normal.   Neck: Trachea normal. Neck supple.   Cardiovascular: Normal rate and regular rhythm.   Pulmonary/Chest: Effort normal and breath sounds normal. No respiratory distress.   Abdominal: Normal appearance. She exhibits no distension. Soft. There is no abdominal tenderness. There is no rebound, no " guarding, no left CVA tenderness and no right CVA tenderness.      Comments: Abdomen is soft with no guarding/rebound.    Musculoskeletal: Normal range of motion.         General: Normal range of motion.   Neurological: She is alert and oriented to person, place, and time. She has normal strength.   Skin: Skin is warm, dry, intact, not diaphoretic and not pale.   Psychiatric: Her speech is normal and behavior is normal. Judgment and thought content normal.   Nursing note and vitals reviewed.    Results for orders placed or performed in visit on 11/27/24   POCT Urinalysis(Instrument)    Collection Time: 11/27/24  2:09 PM   Result Value Ref Range    Color, POC UA Yellow Yellow, Straw, Colorless    Clarity, POC UA Slight Cloudy (A) Clear    Glucose, POC UA Negative Negative    Bilirubin, POC UA Negative Negative    Ketones, POC UA Negative Negative    Spec Grav POC UA 1.025 1.005 - 1.030    Blood, POC UA Negative Negative    pH, POC UA 6.0 5.0 - 8.0    Protein, POC  (A) Negative    Urobilinogen, POC UA 0.2 <=1.0    Nitrite, POC UA Negative Negative    WBC, POC UA Negative Negative   POCT urine pregnancy    Collection Time: 11/27/24  2:11 PM   Result Value Ref Range    POC Preg Test, Ur Negative Negative     Acceptable Yes          Assessment:     1. Vomiting, unspecified vomiting type, unspecified whether nausea present        Plan:       Vomiting, unspecified vomiting type, unspecified whether nausea present  -     POCT urine pregnancy  -     POCT Urinalysis(Instrument)  -     pantoprazole (PROTONIX) 20 MG tablet; Take 1 tablet (20 mg total) by mouth once daily.  Dispense: 30 tablet; Refill: 0  -     promethazine (PHENERGAN) 12.5 MG Tab; Take 1 tablet (12.5 mg total) by mouth every 6 (six) hours as needed (nausea and vomiting).  Dispense: 6 tablet; Refill: 0      UA- proteinuria. No ketones, pyuria, hematuria present. UPT is negative. Discussed results with patient.   No signs of acute or surgical  abdomen on examination. Advised increasing fluids, advanced bland diet as tolerate. Avoid fried/fatty foods, dairy, alcohol. Follow up with primary care doctor in 1-2 days if symptoms persist. Go to ER for new or worsening symptoms, fever, abdominal pain, blood in stool or vomit, decrease urine output, unable to tolerate fluids by mouth.     Patient Instructions   1.  Take all medications as directed. If you have been prescribed antibiotics, make sure to complete them.   2.  Rest and keep yourself/patient well hydrated. For adults, it is recommended to drink at least 8-10 glasses of water daily.   3.  For patients above 6 months of age who are not allergic to and are not on anticoagulants, you can alternate Tylenol and Motrin every 4-6 hours for fever above 100.4F and/or pain.  For patients less than 6 months of age, allergic to or intolerant to NSAIDS, have gastritis, gastric ulcers, or history of GI bleeds, are pregnant, or are on anticoagulant therapy, you can take Tylenol every 4 hours as needed for fever above 100.4F and/or pain.   4. You should schedule a follow-up appointment with your Primary Care Provider/Pediatrician for recheck in 2-3 days or as directed at this visit.   5.  If your condition fails to improve in a timely manner, you should receive another evaluation by your Primary Care Provider/Pediatrician to discuss your concerns or return to urgent care for a recheck.  If your condition worsens at any time, you should report immediately to your nearest Emergency Department for further evaluation. **You must understand that you have received Urgent Care treatment only and that you may be released before all of your medical problems are known or treated. You, the patient, are responsible to arrange for follow-up care as instructed.

## 2024-11-27 NOTE — LETTER
November 27, 2024      [facility] [place]  [address], [address]  [place] [address]  Phone: [phone]  Fax: [phone]       Patient: April April Yoon   YOB: 1990  Date of Visit: 11/27/2024    To Whom It May Concern:    Armando Nettles  was at [facility] on 11/27/2024. The patient may return to work/school in 2-3 days with no restrictions. If you have any questions or concerns, or if I can be of further assistance, please do not hesitate to contact me.    Sincerely,    Araceli Acosta PA-C

## 2024-11-27 NOTE — PATIENT INSTRUCTIONS
You must understand that you have received treatment at an Urgent Care facility only, and that you may be  released before all of your medical problems are known or treated. Urgent Care facilities are not equipped to  handle life threatening emergencies. It is recommended that you seek care at an Emergency Department for  further evaluation of worsening or concerning symptoms, or possibly life threatening conditions as  discussed.    Advised increasing fluids, advanced bland diet as tolerate. Avoid fried/fatty foods, dairy, alcohol. Follow up with primary care doctor in 1-2 days if symptoms persist. Go to ER for new or worsening symptoms, fever, abdominal pain, blood in stool or vomit, decrease urine output.     Patient Instructions   1.  Take all medications as directed. If you have been prescribed antibiotics, make sure to complete them.   2.  Rest and keep yourself/patient well hydrated. For adults, it is recommended to drink at least 8-10 glasses of water daily.   3.  For patients above 6 months of age who are not allergic to and are not on anticoagulants, you can alternate Tylenol and Motrin every 4-6 hours for fever above 100.4F and/or pain.  For patients less than 6 months of age, allergic to or intolerant to NSAIDS, have gastritis, gastric ulcers, or history of GI bleeds, are pregnant, or are on anticoagulant therapy, you can take Tylenol every 4 hours as needed for fever above 100.4F and/or pain.   4. You should schedule a follow-up appointment with your Primary Care Provider/Pediatrician for recheck in 2-3 days or as directed at this visit.   5.  If your condition fails to improve in a timely manner, you should receive another evaluation by your Primary Care Provider/Pediatrician to discuss your concerns or return to urgent care for a recheck.  If your condition worsens at any time, you should report immediately to your nearest Emergency Department for further evaluation. **You must understand that you  have received Urgent Care treatment only and that you may be released before all of your medical problems are known or treated. You, the patient, are responsible to arrange for follow-up care as instructed.

## 2024-12-23 ENCOUNTER — PATIENT MESSAGE (OUTPATIENT)
Dept: ADMINISTRATIVE | Facility: HOSPITAL | Age: 34
End: 2024-12-23
Payer: MEDICAID

## 2024-12-24 ENCOUNTER — OFFICE VISIT (OUTPATIENT)
Dept: URGENT CARE | Facility: CLINIC | Age: 34
End: 2024-12-24
Payer: MEDICAID

## 2024-12-24 VITALS
TEMPERATURE: 98 F | DIASTOLIC BLOOD PRESSURE: 81 MMHG | OXYGEN SATURATION: 98 % | HEIGHT: 65 IN | SYSTOLIC BLOOD PRESSURE: 121 MMHG | WEIGHT: 209.44 LBS | BODY MASS INDEX: 34.89 KG/M2 | HEART RATE: 97 BPM | RESPIRATION RATE: 19 BRPM

## 2024-12-24 DIAGNOSIS — M79.641 BILATERAL HAND PAIN: Primary | ICD-10-CM

## 2024-12-24 DIAGNOSIS — M79.642 BILATERAL HAND PAIN: Primary | ICD-10-CM

## 2024-12-24 PROCEDURE — 99213 OFFICE O/P EST LOW 20 MIN: CPT | Mod: S$GLB,,,

## 2024-12-24 RX ORDER — NAPROXEN 500 MG/1
500 TABLET ORAL 2 TIMES DAILY
Qty: 10 TABLET | Refills: 0 | Status: SHIPPED | OUTPATIENT
Start: 2024-12-24 | End: 2024-12-29

## 2024-12-24 RX ORDER — KETOROLAC TROMETHAMINE 30 MG/ML
30 INJECTION, SOLUTION INTRAMUSCULAR; INTRAVENOUS
Status: COMPLETED | OUTPATIENT
Start: 2024-12-24 | End: 2024-12-24

## 2024-12-24 RX ADMIN — KETOROLAC TROMETHAMINE 30 MG: 30 INJECTION, SOLUTION INTRAMUSCULAR; INTRAVENOUS at 11:12

## 2024-12-24 NOTE — PATIENT INSTRUCTIONS
1.  Take all medications as directed. See attached handout for more information regarding your conditions.   2.  Rest and keep yourself/patient well hydrated.  3.  You can alternate Tylenol and Motrin every 4-6 hours for fever above 100.4F and/or pain.   4. You should schedule a follow-up appointment with your Primary Care Provider for recheck in 2-3 days or as directed at this visit.   5.  If your condition fails to improve in a timely manner, you should receive another evaluation by your Primary Care Provider to discuss your concerns or return to urgent care for a recheck.  If your condition worsens at any time, you should report immediately to your nearest Emergency Department for further evaluation. **You must understand that you have received Urgent Care treatment only and that you may be released before all of your medical problems are known or treated. You, the patient, are responsible to arrange for follow-up care as instructed.
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
## 2025-08-26 ENCOUNTER — HOSPITAL ENCOUNTER (EMERGENCY)
Facility: HOSPITAL | Age: 35
Discharge: HOME | End: 2025-08-26
Attending: EMERGENCY MEDICINE
Payer: MEDICAID

## 2025-08-26 DIAGNOSIS — U07.1 COVID-19 VIRUS DETECTED: ICD-10-CM
